# Patient Record
Sex: FEMALE | Race: BLACK OR AFRICAN AMERICAN | NOT HISPANIC OR LATINO | Employment: UNEMPLOYED | URBAN - METROPOLITAN AREA
[De-identification: names, ages, dates, MRNs, and addresses within clinical notes are randomized per-mention and may not be internally consistent; named-entity substitution may affect disease eponyms.]

---

## 2020-10-15 ENCOUNTER — NURSE TRIAGE (OUTPATIENT)
Dept: ADMINISTRATIVE | Facility: CLINIC | Age: 50
End: 2020-10-15

## 2020-10-16 NOTE — TELEPHONE ENCOUNTER
Pt states that she was returning a call from this number. No calls listed in patient chart. Pt denies any problems or concerns at this time.  Pt advised per protocol and verbalized understanding.    Reason for Disposition   [1] Follow-up call to recent contact AND [2] information only call, no triage required    Additional Information   Negative: Health Information question, no triage required and triager able to answer question   Negative: General information question, no triage required and triager able to answer question   Negative: Question about upcoming scheduled test, no triage required and triager able to answer question   Negative: [1] Caller is not with the adult (patient) AND [2] probable NON-URGENT symptoms   Negative: Requesting regular office appointment   Negative: [1] Caller requesting NON-URGENT health information AND [2] PCP's office is the best resource   Negative: RN needs further essential information from caller in order to complete triage   Negative: [1] Caller is not with the adult (patient) AND [2] reporting urgent symptoms   Negative: Lab result questions   Negative: Medication questions   Negative: Caller can't be reached by phone   Negative: Caller has already spoken to PCP or another triager    Protocols used: INFORMATION ONLY CALL-A-

## 2020-11-24 ENCOUNTER — HISTORICAL (OUTPATIENT)
Dept: RADIOLOGY | Facility: HOSPITAL | Age: 50
End: 2020-11-24

## 2022-04-07 ENCOUNTER — HISTORICAL (OUTPATIENT)
Dept: ADMINISTRATIVE | Facility: HOSPITAL | Age: 52
End: 2022-04-07

## 2022-04-24 VITALS
DIASTOLIC BLOOD PRESSURE: 72 MMHG | OXYGEN SATURATION: 99 % | HEIGHT: 69 IN | WEIGHT: 167 LBS | SYSTOLIC BLOOD PRESSURE: 113 MMHG | BODY MASS INDEX: 24.73 KG/M2

## 2023-01-19 ENCOUNTER — HOSPITAL ENCOUNTER (EMERGENCY)
Facility: HOSPITAL | Age: 53
Discharge: HOME | End: 2023-01-19
Attending: EMERGENCY MEDICINE
Payer: COMMERCIAL

## 2023-01-19 VITALS
SYSTOLIC BLOOD PRESSURE: 133 MMHG | HEIGHT: 69 IN | HEART RATE: 52 BPM | BODY MASS INDEX: 23.55 KG/M2 | RESPIRATION RATE: 16 BRPM | TEMPERATURE: 98 F | OXYGEN SATURATION: 98 % | DIASTOLIC BLOOD PRESSURE: 63 MMHG | WEIGHT: 159 LBS

## 2023-01-19 DIAGNOSIS — R55 SYNCOPE: Primary | ICD-10-CM

## 2023-01-19 DIAGNOSIS — R10.13 EPIGASTRIC ABDOMINAL PAIN: ICD-10-CM

## 2023-01-19 LAB
ALBUMIN SERPL BCP-MCNC: 4 G/DL (ref 3.5–5.2)
ALP SERPL-CCNC: 101 U/L (ref 55–135)
ALT SERPL W/O P-5'-P-CCNC: 18 U/L (ref 10–44)
ANION GAP SERPL CALC-SCNC: 7 MMOL/L (ref 8–16)
AST SERPL-CCNC: 28 U/L (ref 10–40)
B-HCG UR QL: NEGATIVE
BACTERIA #/AREA URNS AUTO: ABNORMAL /HPF
BASOPHILS # BLD AUTO: 0.03 K/UL (ref 0–0.2)
BASOPHILS NFR BLD: 0.8 % (ref 0–1.9)
BILIRUB SERPL-MCNC: 1 MG/DL (ref 0.1–1)
BILIRUB UR QL STRIP: NEGATIVE
BNP SERPL-MCNC: <10 PG/ML (ref 0–99)
BUN SERPL-MCNC: 12 MG/DL (ref 6–20)
CALCIUM SERPL-MCNC: 9.7 MG/DL (ref 8.7–10.5)
CHLORIDE SERPL-SCNC: 108 MMOL/L (ref 95–110)
CLARITY UR REFRACT.AUTO: CLEAR
CO2 SERPL-SCNC: 22 MMOL/L (ref 23–29)
COLOR UR AUTO: COLORLESS
CREAT SERPL-MCNC: 0.9 MG/DL (ref 0.5–1.4)
CTP QC/QA: YES
DIFFERENTIAL METHOD: ABNORMAL
EOSINOPHIL # BLD AUTO: 0 K/UL (ref 0–0.5)
EOSINOPHIL NFR BLD: 0.3 % (ref 0–8)
ERYTHROCYTE [DISTWIDTH] IN BLOOD BY AUTOMATED COUNT: 13.2 % (ref 11.5–14.5)
EST. GFR  (NO RACE VARIABLE): >60 ML/MIN/1.73 M^2
GLUCOSE SERPL-MCNC: 90 MG/DL (ref 70–110)
GLUCOSE UR QL STRIP: NEGATIVE
HCT VFR BLD AUTO: 41.7 % (ref 37–48.5)
HGB BLD-MCNC: 13.9 G/DL (ref 12–16)
HGB UR QL STRIP: ABNORMAL
IMM GRANULOCYTES # BLD AUTO: 0 K/UL (ref 0–0.04)
IMM GRANULOCYTES NFR BLD AUTO: 0 % (ref 0–0.5)
KETONES UR QL STRIP: NEGATIVE
LEUKOCYTE ESTERASE UR QL STRIP: NEGATIVE
LIPASE SERPL-CCNC: 27 U/L (ref 4–60)
LYMPHOCYTES # BLD AUTO: 0.6 K/UL (ref 1–4.8)
LYMPHOCYTES NFR BLD: 15.1 % (ref 18–48)
MCH RBC QN AUTO: 28.7 PG (ref 27–31)
MCHC RBC AUTO-ENTMCNC: 33.3 G/DL (ref 32–36)
MCV RBC AUTO: 86 FL (ref 82–98)
MICROSCOPIC COMMENT: ABNORMAL
MONOCYTES # BLD AUTO: 0.3 K/UL (ref 0.3–1)
MONOCYTES NFR BLD: 9 % (ref 4–15)
NEUTROPHILS # BLD AUTO: 2.7 K/UL (ref 1.8–7.7)
NEUTROPHILS NFR BLD: 74.8 % (ref 38–73)
NITRITE UR QL STRIP: NEGATIVE
NRBC BLD-RTO: 0 /100 WBC
PH UR STRIP: 7 [PH] (ref 5–8)
PLATELET # BLD AUTO: 223 K/UL (ref 150–450)
PMV BLD AUTO: 11 FL (ref 9.2–12.9)
POTASSIUM SERPL-SCNC: 4.4 MMOL/L (ref 3.5–5.1)
PROT SERPL-MCNC: 7.3 G/DL (ref 6–8.4)
PROT UR QL STRIP: NEGATIVE
RBC # BLD AUTO: 4.84 M/UL (ref 4–5.4)
RBC #/AREA URNS AUTO: 13 /HPF (ref 0–4)
SARS-COV-2 RDRP RESP QL NAA+PROBE: NEGATIVE
SODIUM SERPL-SCNC: 137 MMOL/L (ref 136–145)
SP GR UR STRIP: 1.01 (ref 1–1.03)
SQUAMOUS #/AREA URNS AUTO: 1 /HPF
TROPONIN I SERPL DL<=0.01 NG/ML-MCNC: 0.01 NG/ML (ref 0–0.03)
URN SPEC COLLECT METH UR: ABNORMAL
WBC # BLD AUTO: 3.65 K/UL (ref 3.9–12.7)
WBC #/AREA URNS AUTO: 2 /HPF (ref 0–5)

## 2023-01-19 PROCEDURE — 84484 ASSAY OF TROPONIN QUANT: CPT | Performed by: STUDENT IN AN ORGANIZED HEALTH CARE EDUCATION/TRAINING PROGRAM

## 2023-01-19 PROCEDURE — 96360 HYDRATION IV INFUSION INIT: CPT

## 2023-01-19 PROCEDURE — 93010 ELECTROCARDIOGRAM REPORT: CPT | Mod: ,,, | Performed by: INTERNAL MEDICINE

## 2023-01-19 PROCEDURE — 85025 COMPLETE CBC W/AUTO DIFF WBC: CPT | Performed by: STUDENT IN AN ORGANIZED HEALTH CARE EDUCATION/TRAINING PROGRAM

## 2023-01-19 PROCEDURE — 80053 COMPREHEN METABOLIC PANEL: CPT | Performed by: STUDENT IN AN ORGANIZED HEALTH CARE EDUCATION/TRAINING PROGRAM

## 2023-01-19 PROCEDURE — 25000003 PHARM REV CODE 250: Performed by: STUDENT IN AN ORGANIZED HEALTH CARE EDUCATION/TRAINING PROGRAM

## 2023-01-19 PROCEDURE — 83690 ASSAY OF LIPASE: CPT | Performed by: STUDENT IN AN ORGANIZED HEALTH CARE EDUCATION/TRAINING PROGRAM

## 2023-01-19 PROCEDURE — 81025 URINE PREGNANCY TEST: CPT | Performed by: STUDENT IN AN ORGANIZED HEALTH CARE EDUCATION/TRAINING PROGRAM

## 2023-01-19 PROCEDURE — 99284 PR EMERGENCY DEPT VISIT,LEVEL IV: ICD-10-PCS | Mod: CS,,, | Performed by: EMERGENCY MEDICINE

## 2023-01-19 PROCEDURE — 93010 EKG 12-LEAD: ICD-10-PCS | Mod: ,,, | Performed by: INTERNAL MEDICINE

## 2023-01-19 PROCEDURE — 99284 EMERGENCY DEPT VISIT MOD MDM: CPT | Mod: CS,,, | Performed by: EMERGENCY MEDICINE

## 2023-01-19 PROCEDURE — U0002 COVID-19 LAB TEST NON-CDC: HCPCS | Performed by: EMERGENCY MEDICINE

## 2023-01-19 PROCEDURE — 83880 ASSAY OF NATRIURETIC PEPTIDE: CPT | Performed by: STUDENT IN AN ORGANIZED HEALTH CARE EDUCATION/TRAINING PROGRAM

## 2023-01-19 PROCEDURE — 93005 ELECTROCARDIOGRAM TRACING: CPT

## 2023-01-19 PROCEDURE — 99284 EMERGENCY DEPT VISIT MOD MDM: CPT | Mod: 25

## 2023-01-19 PROCEDURE — 81001 URINALYSIS AUTO W/SCOPE: CPT | Performed by: EMERGENCY MEDICINE

## 2023-01-19 RX ORDER — MAG HYDROX/ALUMINUM HYD/SIMETH 200-200-20
15 SUSPENSION, ORAL (FINAL DOSE FORM) ORAL
Status: COMPLETED | OUTPATIENT
Start: 2023-01-19 | End: 2023-01-19

## 2023-01-19 RX ORDER — FAMOTIDINE 20 MG/1
20 TABLET, FILM COATED ORAL 2 TIMES DAILY
Qty: 20 TABLET | Refills: 0 | Status: SHIPPED | OUTPATIENT
Start: 2023-01-19 | End: 2023-01-19 | Stop reason: SDUPTHER

## 2023-01-19 RX ORDER — FAMOTIDINE 20 MG/1
20 TABLET, FILM COATED ORAL 2 TIMES DAILY
Qty: 60 TABLET | Refills: 0 | Status: ON HOLD | OUTPATIENT
Start: 2023-01-19 | End: 2023-02-03 | Stop reason: HOSPADM

## 2023-01-19 RX ADMIN — SODIUM CHLORIDE 500 ML: 9 INJECTION, SOLUTION INTRAVENOUS at 07:01

## 2023-01-19 RX ADMIN — ALUMINUM HYDROXIDE, MAGNESIUM HYDROXIDE, AND SIMETHICONE 15 ML: 200; 200; 20 SUSPENSION ORAL at 07:01

## 2023-01-19 RX ADMIN — ALUMINUM HYDROXIDE, MAGNESIUM HYDROXIDE, AND SIMETHICONE 15 ML: 200; 200; 20 SUSPENSION ORAL at 09:01

## 2023-01-19 NOTE — ED PROVIDER NOTES
Encounter Date: 1/19/2023       History     Chief Complaint   Patient presents with    Loss of Consciousness     Reports syncope last night.  States she woke up at 0100 feeling like her heart was racing, she got light headed and thinks she passed out.  Woke up on floor, unsure of what happened.     Patient is a 52 year old female with a PMHx of menorrhagia who presents to the ED for complaints of a syncopal event and 2 subsequent near syncopal events. She was awoken from sleep secondary to epigastric abdominal comfort described as indigestion and a need to go to the bathroom along with lightheadedness. While on way to bathroom, she syncopized and thinks she may have hit her face as she is having mild right sided jaw pain and right sided shoulder pain. She then awoke and had the other 2 near syncopal events while sitting on the toilet. States her PO intake has been decreased and has also had 2 bouts of NB diarrhea. No associated fevers or vomiting. She further denies chest pain, shortness of breath, black/bloody stools or recent bouts of menorrhagia.    Review of patient's allergies indicates:   Allergen Reactions    Toradol [ketorolac] Other (See Comments)     Hypotension reported     No past medical history on file.  No past surgical history on file.  No family history on file.     Review of Systems   Constitutional:  Negative for activity change, chills and fever.   HENT:  Negative for congestion, ear pain and sore throat.         Jaw pain   Respiratory:  Negative for shortness of breath and stridor.    Cardiovascular:  Negative for chest pain and palpitations.   Gastrointestinal:  Positive for abdominal pain (epigastric) and diarrhea (x2). Negative for nausea and vomiting.   Genitourinary:  Negative for dysuria and urgency.   Musculoskeletal:  Positive for myalgias. Negative for back pain.   Skin:  Negative for rash.   Neurological:  Positive for syncope. Negative for dizziness, weakness and headaches.    Hematological:  Does not bruise/bleed easily.     Physical Exam     Initial Vitals [01/19/23 0552]   BP Pulse Resp Temp SpO2   111/71 76 16 97.9 °F (36.6 °C) 100 %      MAP       --         Physical Exam    Nursing note and vitals reviewed.  Constitutional: She appears well-developed and well-nourished. She is not diaphoretic. No distress.   Well-appearing. Speaking full sentences. No acute distress.   HENT:   Head: Normocephalic and atraumatic.   Right Ear: External ear normal.   Left Ear: External ear normal.   No trismus. No malocclusion. Able to bite down on tongue depressor with applied traction.   Neck: Neck supple.   Cardiovascular:  Normal rate, regular rhythm, normal heart sounds and intact distal pulses.           Pulmonary/Chest: Breath sounds normal. No respiratory distress. She has no wheezes. She has no rhonchi. She has no rales.   Abdominal: Abdomen is soft. She exhibits no distension. There is no abdominal tenderness. There is no rebound and no guarding.   Musculoskeletal:      Cervical back: Neck supple.      Comments: Tenderness to palpation of right upper trapezius with normal ROM at shoulder joint without bony deformity.     Neurological: She is alert and oriented to person, place, and time. GCS score is 15. GCS eye subscore is 4. GCS verbal subscore is 5. GCS motor subscore is 6.   Skin: Skin is warm. Capillary refill takes less than 2 seconds. No rash noted.   Psychiatric: She has a normal mood and affect.       ED Course   Procedures  Labs Reviewed   CBC W/ AUTO DIFFERENTIAL - Abnormal; Notable for the following components:       Result Value    WBC 3.65 (*)     Lymph # 0.6 (*)     Gran % 74.8 (*)     Lymph % 15.1 (*)     All other components within normal limits    Narrative:     add on lipase per Geo Barrios MD order# [id number] 01/19/2023 @   06:54    COMPREHENSIVE METABOLIC PANEL - Abnormal; Notable for the following components:    CO2 22 (*)     Anion Gap 7 (*)     All other  components within normal limits    Narrative:     add on lipase per Geo Barrios MD order# [id number] 01/19/2023 @   06:54    URINALYSIS, REFLEX TO URINE CULTURE - Abnormal; Notable for the following components:    Color, UA Colorless (*)     Occult Blood UA 2+ (*)     All other components within normal limits    Narrative:     Specimen Source->Urine   URINALYSIS MICROSCOPIC - Abnormal; Notable for the following components:    RBC, UA 13 (*)     All other components within normal limits    Narrative:     Specimen Source->Urine   LIPASE   LIPASE    Narrative:     add on lipase per Geo Barrios MD order# [id number] 01/19/2023 @   06:54    SARS-COV-2 RNA AMPLIFICATION, QUAL   TROPONIN I   B-TYPE NATRIURETIC PEPTIDE   B-TYPE NATRIURETIC PEPTIDE    Narrative:     add on lipase per Geo Barrios MD order# [id number] 01/19/2023 @   06:54   add BNP per Dr. Inés To  01/19/2023  07:23     add on troponin per Inés To MD order# [id number] 01/19/2023 @ 07:53    TROPONIN I    Narrative:     add on lipase per Geo Barrios MD order# [id number] 01/19/2023 @   06:54   add BNP per Dr. Inés To  01/19/2023  07:23     add on troponin per Inés To MD order# [id number] 01/19/2023 @ 07:53    POCT URINE PREGNANCY     EKG Readings: (Independently Interpreted)   Initial Reading: No STEMI. Rhythm: Normal Sinus Rhythm. Heart Rate: 63. Ectopy: No Ectopy. Conduction: Normal.   Atrial enlargement. No prior EKGs for comparison.   ECG Results              EKG 12-lead (Final result)  Result time 01/19/23 10:28:58      Final result by Interface, Lab [facility] (01/19/23 10:28:58)                   Narrative:    Test Reason : R55,    Vent. Rate : 063 BPM     Atrial Rate : 063 BPM     P-R Int : 158 ms          QRS Dur : 068 ms      QT Int : 392 ms       P-R-T Axes : 069 100 057 degrees     QTc Int : 401 ms    Normal sinus rhythm  Right atrial enlargement  Cannot exclude  Lateral infarct ,age undetermined  Abnormal ECG  No previous  ECGs available  Confirmed by Taye MADISON MD ([id number]) on 1/19/2023 10:28:49 AM    Referred By: [email or web address]   SELF           Confirmed By:Taye MADISON MD                                  Imaging Results    None          Medications   aluminum-magnesium hydroxide-simethicone 200-200-20 mg/5 mL suspension 15 mL (15 mLs Oral Given 1/19/23 3301)   sodium chloride 0.9% bolus 500 mL 500 mL (0 mLs Intravenous Stopped 1/19/23 0845)   aluminum-magnesium hydroxide-simethicone 200-200-20 mg/5 mL suspension 15 mL (15 mLs Oral Given 1/19/23 0915)     Medical Decision Making:   History:   Old Medical Records: I decided to obtain old medical records.  Initial Assessment:   Emergent evaluation of syncope/near syncopal events. She is afebrile and hemodynamically stable.  Differential Diagnosis:   Vasovagal vs orthostatic syncope, GERD/gastritis, electrolyte abnormality, dehydration, VALENTINA  Clinical Tests:   Lab Tests: Ordered and Reviewed  Medical Tests: Ordered and Reviewed  ED Management:  Additionally considered mandibular fracture, but less likely given physical exam. Overall, her presentation is most consistent with orthostatic syncope. Abdominal pain is improved after Maalox x 2, and she remains asymptomatic on reassessment.  Labs are unremarkable. Given IVF. Referral to Cardiology placed for possible Holter monitor if she continues to be symptomatic with recurrent syncopal events.  Additionally given strict ED return precautions, and she expressed understanding.                        Clinical Impression:   Final diagnoses:  [R55] Syncope (Primary)  [R10.13] Epigastric abdominal pain        ED Disposition Condition    Discharge Stable          ED Prescriptions       Medication Sig Dispense Start Date End Date Auth. Provider    aluminum-magnesium hydroxide 200-200 mg/5 mL suspension Take 15 mLs by mouth every 6 (six) hours as needed for Indigestion. 60 mL 1/19/2023 2/18/2023 Geo Barrios MD    famotidine (PEPCID) 20 MG tablet   (Status: Discontinued) Take 1 tablet (20 mg total) by mouth 2 (two) times daily. 20 tablet 1/19/2023 1/19/2023 Geo Barrios MD    famotidine (PEPCID) 20 MG tablet Take 1 tablet (20 mg total) by mouth 2 (two) times daily. 60 tablet 1/19/2023 2/18/2023 Geo Barrios MD          Follow-up Information    None          Geo Barrios MD  Resident  01/19/23 6082

## 2023-01-19 NOTE — DISCHARGE INSTRUCTIONS
Please return to the ER if you begin to experience symptoms including chest pain or shortness of breath. Additionally, if you begin to have recurrent episodes of syncope (passing out), please return as soon as possible. You are being given a referral to cardiology for further workup of the syncopal events. Please follow up with them if you continue to have symptoms.

## 2023-01-19 NOTE — ED TRIAGE NOTES
"Marialuisa Joseph, a 52 y.o. female presents to the ED w/ complaint of syncopal episode. Pt reports she "went black" last night while trying to walk to the bathroom. Pt reports she felt her heart racing and became lightheaded. Pt reports she woke up on the floor and is unsure of what happened. Reports jaw pain and pain to right side of face. Pt states "I think I fell face first onto the tile bathroom floor." Currently Aox4. Hyperactivity and pressured speech noted. Denies n/v. Reports two episodes of diarrhea at home.     Triage note:  Chief Complaint   Patient presents with    Loss of Consciousness     Reports syncope last night.  States she woke up at 0100 feeling like her heart was racing, she got light headed and thinks she passed out.  Woke up on floor, unsure of what happened.     Review of patient's allergies indicates:   Allergen Reactions    Toradol [ketorolac] Other (See Comments)     Hypotension reported     No past medical history on file.    Patient identifiers verified and correct for Marialuisa Joseph    LOC: The patient is awake, alert, and aware of environment. The patient is AOX4 and speaking appropriately.   APPEARANCE: No acute distress noted.   HEENT: Reports jaw pain and pain to R face, PERRLA  PSYCHOSOCIAL: Patient is hyperactive with pressure speech. Denies SI/HI.  SKIN: The skin is warm, dry, color consistent with ethnicity. No breakdown or brusing visible.  RESPIRATORY: Airway is open and patent. Bilateral chest rise and fall. Respiratory rate even and unlabored.  No accessory muscle use noted.  CARDIAC: Patient has a normal rate and rhythm. No complaints of chest pain.  ABDOMEN/GI: Reports intermittent stomach discomfort and "bubbling." Reports 2 episodes of diarrhea at home this morning.  Soft, non tender. No distention noted. Denies n/v.  URINARY:  Voids independently without difficulty. No complaints of frequency, urgency, burning, or blood in urine.   NEUROLOGIC: Eyes open " spontaneously. Speech clear.  Able to follow commands, demonstrating ability to actively and appropriately communicate within context of current conversation. Symmetrical facial muscles. Movement is purposeful. Denies current dizziness/lightheadedness.  MUSCULOSKELETAL: No obvious deformities noted. Full ROM in all extremities.  PERIPHERAL VASCULAR: Cap refill <3 secs bilaterally. No peripheral edema noted. Denies numbness and tingling in extremities.

## 2023-01-19 NOTE — PROVIDER PROGRESS NOTES - EMERGENCY DEPT.
Encounter Date: 1/19/2023    ED Physician Progress Notes          Physician Attestation Statement for Resident:  As the supervising MD   Physician Attestation Statement: I have personally seen and examined this patient.       I agree with the history unless otherwise noted.     As the supervising MD I agree with the PE unless otherwise noted.      I have reviewed and agree with the residents interpretation of the following unless otherwise noted:   I have personally reviewed and interpreted the patients laboratory studies: trop neg, hgb 13, lipase WNL, COVId neg  I have personally reviewed and interpreted imaging studies: Bedside US with no pericardial effusion, nl appearing function  I have personally reviewed and interpreted the patient's EKG: NSR, no ischemic ST/TW changes      I have also reviewed the following:   old records at this facility: + menorrhagia  old EKGs: no old EKG for review    As the supervising MD I agree with the treatment, course, plan, and disposition unless otherwise noted.    Regarding syncope:   No evidence of dysrhythmia on EKG, nor prolonged QTc  No evidence of ischemia on EKG and negative troponin   No evidence of significant hyponatremia   No evidence of significant anemia   No clinical evidence to suggest PE  Bedside US with nl appearing EF, no effusion  Patient's description of sxs is most c/w vasovagal syncope    Patient was discharged home with PMD f/u as needed with strict RTED precautions.

## 2023-01-31 ENCOUNTER — OFFICE VISIT (OUTPATIENT)
Dept: FAMILY MEDICINE | Facility: CLINIC | Age: 53
End: 2023-01-31
Payer: COMMERCIAL

## 2023-01-31 VITALS
OXYGEN SATURATION: 99 % | WEIGHT: 161.81 LBS | DIASTOLIC BLOOD PRESSURE: 66 MMHG | SYSTOLIC BLOOD PRESSURE: 111 MMHG | HEART RATE: 51 BPM | BODY MASS INDEX: 23.89 KG/M2

## 2023-01-31 DIAGNOSIS — R00.2 PALPITATIONS: ICD-10-CM

## 2023-01-31 DIAGNOSIS — R94.31 ABNORMAL EKG: ICD-10-CM

## 2023-01-31 DIAGNOSIS — R42 DIZZINESS AND GIDDINESS: ICD-10-CM

## 2023-01-31 DIAGNOSIS — R55 SYNCOPE, UNSPECIFIED SYNCOPE TYPE: Primary | ICD-10-CM

## 2023-01-31 DIAGNOSIS — R42 DIZZINESS: ICD-10-CM

## 2023-01-31 PROCEDURE — 1160F PR REVIEW ALL MEDS BY PRESCRIBER/CLIN PHARMACIST DOCUMENTED: ICD-10-PCS | Mod: CPTII,,, | Performed by: FAMILY MEDICINE

## 2023-01-31 PROCEDURE — 3078F DIAST BP <80 MM HG: CPT | Mod: CPTII,,, | Performed by: FAMILY MEDICINE

## 2023-01-31 PROCEDURE — 3074F PR MOST RECENT SYSTOLIC BLOOD PRESSURE < 130 MM HG: ICD-10-PCS | Mod: CPTII,,, | Performed by: FAMILY MEDICINE

## 2023-01-31 PROCEDURE — 1160F RVW MEDS BY RX/DR IN RCRD: CPT | Mod: CPTII,,, | Performed by: FAMILY MEDICINE

## 2023-01-31 PROCEDURE — 1159F PR MEDICATION LIST DOCUMENTED IN MEDICAL RECORD: ICD-10-PCS | Mod: CPTII,,, | Performed by: FAMILY MEDICINE

## 2023-01-31 PROCEDURE — 3008F PR BODY MASS INDEX (BMI) DOCUMENTED: ICD-10-PCS | Mod: CPTII,,, | Performed by: FAMILY MEDICINE

## 2023-01-31 PROCEDURE — 99214 OFFICE O/P EST MOD 30 MIN: CPT | Mod: ,,, | Performed by: FAMILY MEDICINE

## 2023-01-31 PROCEDURE — 99214 PR OFFICE/OUTPT VISIT, EST, LEVL IV, 30-39 MIN: ICD-10-PCS | Mod: ,,, | Performed by: FAMILY MEDICINE

## 2023-01-31 PROCEDURE — 3008F BODY MASS INDEX DOCD: CPT | Mod: CPTII,,, | Performed by: FAMILY MEDICINE

## 2023-01-31 PROCEDURE — 1159F MED LIST DOCD IN RCRD: CPT | Mod: CPTII,,, | Performed by: FAMILY MEDICINE

## 2023-01-31 PROCEDURE — 3078F PR MOST RECENT DIASTOLIC BLOOD PRESSURE < 80 MM HG: ICD-10-PCS | Mod: CPTII,,, | Performed by: FAMILY MEDICINE

## 2023-01-31 PROCEDURE — 3074F SYST BP LT 130 MM HG: CPT | Mod: CPTII,,, | Performed by: FAMILY MEDICINE

## 2023-01-31 NOTE — PROGRESS NOTES
Patient ID: [id number]     Chief Complaint: Follow-up (Hospital visit)        HPI:     Marialuisa Joseph is a 52 y.o. female here today for followup s/p ER visit on 01/19/2023 due to dizziness and 2 syncopal episodes. She had NL CBC, CMP, and UA. Had abnormal EKG, but hasn't seen Cardiology. She hasn't had CT head. She has a history of menorrhagia, always has RBCs in urine. She reports maternal GM with angina, maternal GGM had pacemaker, maternal great aunt had pacemaker, mother has HTN. She has some episodes of dizzy spells, headaches, and palpitations at times, but no syncope since ER visit. She denies fever, chills, nausea, vomiting, chest pain, SOB, edema, anxiety and depression.  - Patient is without any other complaints today.     ----------------------------  Arthritis     Past Surgical History:   Procedure Laterality Date    TUMOR REMOVAL Bilateral 1988       Review of patient's allergies indicates:   Allergen Reactions    Toradol [ketorolac] Other (See Comments)     Hypotension reported       No outpatient medications have been marked as taking for the 1/31/23 encounter (Office Visit) with Lisbeth Ocampo MD.       Social History     Socioeconomic History    Marital status:    Occupational History    Occupation: Researcher   Tobacco Use    Smoking status: Never    Smokeless tobacco: Never   Substance and Sexual Activity    Alcohol use: Not Currently    Drug use: Never    Sexual activity: Yes        History reviewed. No pertinent family history.     Subjective:       Review of Systems:    See HPI for details    Constitutional: Denies Change in appetite. Denies Chills. Denies Fever. Denies Night sweats.  Eye: Denies Blurred vision. Denies Discharge. Denies Eye pain.  ENT: Denies Decreased hearing. Denies Sore throat. Denies Swollen glands.  Respiratory: Denies Cough. Denies Shortness of breath. Denies Shortness of breath with exertion. Denies Wheezing.  Cardiovascular: As per HPI. Denies  Chest pain at rest. Denies Chest pain with exertion. Denies Irregular heartbeat.   Gastrointestinal: Denies Abdominal pain. Denies Diarrhea. Denies Nausea. Denies Vomiting. Denies Hematemesis or Hematochezia.  Genitourinary: Denies Dysuria. Denies Urinary frequency. Denies Urinary urgency. Denies Blood in urine.  Endocrine: Denies Cold intolerance. Denies Excessive thirst. Denies Heat intolerance. Denies Weight loss. Denies Weight gain.  Musculoskeletal: Denies Painful joints. Denies Weakness.  Integumentary: Denies Rash. Denies Itching. Denies Dry skin.  Neurologic: As per HPI. Denies Headache.  Psychiatric: Denies Depression. Denies Anxiety. Denies Suicidal/Homicidal ideations.    All Other ROS: Negative except as stated in HPI.       Objective:     /66 (BP Location: Right arm, Patient Position: Sitting, BP Method: Medium (Automatic))   Pulse (!) 51   Wt 73.4 kg (161 lb 12.8 oz)   LMP 01/23/2023 (Exact Date) Comment: Spotting having periods every few months  SpO2 99%   BMI 23.89 kg/m²     Physical Exam    General: Alert and oriented, No acute distress.  Head: Normocephalic, Atraumatic.  Eye: Pupils are equal, round and reactive to light, Extraocular movements are intact, Sclera non-icteric.  Ears/Nose/Throat: Normal, Mucosa moist,Clear.  Neck/Thyroid: Supple, Non-tender, No carotid bruit, No palpable thyromegaly or thyroid nodule, No lymphadenopathy, No JVD, Full range of motion.  Respiratory: Clear to auscultation bilaterally; No wheezes, rales or rhonchi,Non-labored respirations, Symmetrical chest wall expansion.  Cardiovascular: Bradycardia, regular rhythm, S1/S2 normal, No murmurs, rubs or gallops.  Gastrointestinal: Soft, Non-tender, Non-distended, Normal bowel sounds, No palpable organomegaly.  Musculoskeletal: Normal range of motion.  Integumentary: Warm, Dry, Intact, No suspicious lesions or rashes.  Extremities: No clubbing, cyanosis or edema  Neurologic: No focal deficits, Cranial Nerves  II-XII are grossly intact, Motor strength normal upper and lower extremities, Sensory exam intact.  Psychiatric: Normal interaction, Coherent speech, Euthymic mood, Appropriate affect.     *ER records from 01/19/2023 were reviewed and discussed with patient and patient voices understanding.*      Assessment:       ICD-10-CM ICD-9-CM   1. Syncope, unspecified syncope type  R55 780.2   2. Dizziness  R42 780.4   3. Palpitations  R00.2 785.1   4. Abnormal EKG  R94.31 794.31   5. Dizziness and giddiness  R42 780.4        Plan:     Problem List Items Addressed This Visit    None  Visit Diagnoses       Syncope, unspecified syncope type    -  Primary    Relevant Orders    Ambulatory referral/consult to Cardiology    Dizziness        Relevant Orders    Ambulatory referral/consult to Cardiology    Palpitations        Abnormal EKG        Relevant Orders    Ambulatory referral/consult to Cardiology    Dizziness and giddiness        Relevant Orders    CT Head Without Contrast         1. Syncope, unspecified syncope type  - Ambulatory referral/consult to Cardiology; Future for cardiac evaluation.  - Fall precautions encouraged. If Cardiac workup is negative, will proceed with Neurology referral. Patient will have wellness with labs done in 8 weeks. Notify M.D. or ER if symptoms persist or worsen, temp >100.4, or any acute illness.      2. Dizziness  - Ambulatory referral/consult to Cardiology; Future  - Same as #1.     3. Palpitations  - Currently asymptomatic, same as #1.     4. Abnormal EKG  - Ambulatory referral/consult to Cardiology; Future  - Same as #1.     5. Dizziness and giddiness  - CT Head Without Contrast; Future  - Same as #1.       Marialuisa was seen today for follow-up.    Diagnoses and all orders for this visit:    Syncope, unspecified syncope type  -     Ambulatory referral/consult to Cardiology; Future    Dizziness  -     Ambulatory referral/consult to Cardiology; Future    Palpitations    Abnormal EKG  -      Ambulatory referral/consult to Cardiology; Future    Dizziness and giddiness  -     CT Head Without Contrast; Future          Medication List with Changes/Refills   Current Medications    ALUMINUM-MAGNESIUM HYDROXIDE 200-200 MG/5 ML SUSPENSION    Take 15 mLs by mouth every 6 (six) hours as needed for Indigestion.       Start Date: 1/19/2023 End Date: 2/18/2023    FAMOTIDINE (PEPCID) 20 MG TABLET    Take 1 tablet (20 mg total) by mouth 2 (two) times daily.       Start Date: 1/19/2023 End Date: 2/18/2023          Follow up in about 2 months (around 3/31/2023) for Wellness.

## 2023-02-02 ENCOUNTER — HOSPITAL ENCOUNTER (OUTPATIENT)
Facility: HOSPITAL | Age: 53
LOS: 1 days | Discharge: HOME | End: 2023-02-03
Attending: FAMILY MEDICINE | Admitting: INTERNAL MEDICINE
Payer: COMMERCIAL

## 2023-02-02 ENCOUNTER — TELEPHONE (OUTPATIENT)
Dept: FAMILY MEDICINE | Facility: CLINIC | Age: 53
End: 2023-02-02
Payer: COMMERCIAL

## 2023-02-02 DIAGNOSIS — R00.2 PALPITATIONS: ICD-10-CM

## 2023-02-02 DIAGNOSIS — R00.1 SYMPTOMATIC BRADYCARDIA: Primary | ICD-10-CM

## 2023-02-02 DIAGNOSIS — R00.0 RAPID HEART BEAT: ICD-10-CM

## 2023-02-02 DIAGNOSIS — R00.1 BRADYCARDIA: ICD-10-CM

## 2023-02-02 DIAGNOSIS — R55 NEAR SYNCOPE: ICD-10-CM

## 2023-02-02 LAB
ABS NEUT CALC (OHS): 1.1 X10(3)/MCL (ref 2.1–9.2)
ALBUMIN SERPL-MCNC: 3.6 G/DL (ref 3.5–5)
ALBUMIN/GLOB SERPL: 1.1 RATIO (ref 1.1–2)
ALP SERPL-CCNC: 177 UNIT/L (ref 40–150)
ALT SERPL-CCNC: 83 UNIT/L (ref 0–55)
ANISOCYTOSIS BLD QL SMEAR: ABNORMAL
AST SERPL-CCNC: 48 UNIT/L (ref 5–34)
AV INDEX (PROSTH): 0.62
AV MEAN GRADIENT: 5 MMHG
AV PEAK GRADIENT: 11 MMHG
AV VALVE AREA: 2.03 CM2
AV VELOCITY RATIO: 0.64
B-HCG SERPL QL: NEGATIVE
BASOPHILS NFR BLD MANUAL: 0.07 X10(3)/MCL (ref 0–0.2)
BASOPHILS NFR BLD MANUAL: 3 % (ref 0–2)
BILIRUBIN DIRECT+TOT PNL SERPL-MCNC: 1.3 MG/DL
BSA FOR ECHO PROCEDURE: 1.91 M2
BUN SERPL-MCNC: 9.5 MG/DL (ref 9.8–20.1)
CALCIUM SERPL-MCNC: 9.6 MG/DL (ref 8.4–10.2)
CHLORIDE SERPL-SCNC: 106 MMOL/L (ref 98–107)
CO2 SERPL-SCNC: 25 MMOL/L (ref 22–29)
CREAT SERPL-MCNC: 1.15 MG/DL (ref 0.55–1.02)
CV ECHO LV RWT: 0.32 CM
DOP CALC AO PEAK VEL: 1.63 M/S
DOP CALC AO VTI: 37.5 CM
DOP CALC LVOT AREA: 3.3 CM2
DOP CALC LVOT DIAMETER: 2.05 CM
DOP CALC LVOT PEAK VEL: 1.05 M/S
DOP CALC LVOT STROKE VOLUME: 76.21 CM3
DOP CALC MV VTI: 33.1 CM
DOP CALCLVOT PEAK VEL VTI: 23.1 CM
E WAVE DECELERATION TIME: 304.76 MSEC
E/A RATIO: 1.52
E/E' RATIO: 7.83 M/S
ECHO LV POSTERIOR WALL: 0.79 CM (ref 0.6–1.1)
EJECTION FRACTION: 65 %
EOSINOPHIL NFR BLD MANUAL: 0.02 X10(3)/MCL (ref 0–0.9)
EOSINOPHIL NFR BLD MANUAL: 1 % (ref 0–8)
ERYTHROCYTE [DISTWIDTH] IN BLOOD BY AUTOMATED COUNT: 12.8 % (ref 11.5–17)
FRACTIONAL SHORTENING: 34 % (ref 28–44)
GFR SERPLBLD CREATININE-BSD FMLA CKD-EPI: 58 MLS/MIN/1.73/M2
GLOBULIN SER-MCNC: 3.4 GM/DL (ref 2.4–3.5)
GLUCOSE SERPL-MCNC: 88 MG/DL (ref 74–100)
HCT VFR BLD AUTO: 40.7 % (ref 37–47)
HGB BLD-MCNC: 13.6 GM/DL (ref 12–16)
HIV 1+2 AB+HIV1 P24 AG SERPL QL IA: NONREACTIVE
IMM GRANULOCYTES # BLD AUTO: 0 X10(3)/MCL (ref 0–0.04)
IMM GRANULOCYTES NFR BLD AUTO: 0 %
INTERVENTRICULAR SEPTUM: 0.86 CM (ref 0.6–1.1)
LEFT ATRIUM SIZE: 2.58 CM
LEFT INTERNAL DIMENSION IN SYSTOLE: 3.25 CM (ref 2.1–4)
LEFT VENTRICLE DIASTOLIC VOLUME INDEX: 59.55 ML/M2
LEFT VENTRICLE DIASTOLIC VOLUME: 113.74 ML
LEFT VENTRICLE MASS INDEX: 72 G/M2
LEFT VENTRICLE SYSTOLIC VOLUME INDEX: 22.3 ML/M2
LEFT VENTRICLE SYSTOLIC VOLUME: 42.58 ML
LEFT VENTRICULAR INTERNAL DIMENSION IN DIASTOLE: 4.92 CM (ref 3.5–6)
LEFT VENTRICULAR MASS: 137.46 G
LV LATERAL E/E' RATIO: 7.83 M/S
LV SEPTAL E/E' RATIO: 7.83 M/S
LVOT MG: 1.92 MMHG
LVOT MV: 0.64 CM/S
LYMPHOCYTES NFR BLD MANUAL: 1.15 X10(3)/MCL
LYMPHOCYTES NFR BLD MANUAL: 48 % (ref 13–40)
MCH RBC QN AUTO: 28.6 PG
MCHC RBC AUTO-ENTMCNC: 33.4 MG/DL (ref 33–36)
MCV RBC AUTO: 85.7 FL (ref 80–94)
MONOCYTES NFR BLD MANUAL: 0.05 X10(3)/MCL (ref 0.1–1.3)
MONOCYTES NFR BLD MANUAL: 2 % (ref 2–11)
MV MEAN GRADIENT: 1 MMHG
MV PEAK A VEL: 0.62 M/S
MV PEAK E VEL: 0.94 M/S
MV PEAK GRADIENT: 3 MMHG
MV STENOSIS PRESSURE HALF TIME: 88.38 MS
MV VALVE AREA BY CONTINUITY EQUATION: 2.3 CM2
MV VALVE AREA P 1/2 METHOD: 2.49 CM2
NEUTROPHILS NFR BLD MANUAL: 46 % (ref 47–80)
NRBC BLD AUTO-RTO: 0 %
PISA TR MAX VEL: 2.19 M/S
PLATELET # BLD AUTO: 216 X10(3)/MCL (ref 130–400)
PLATELET # BLD EST: NORMAL 10*3/UL
PMV BLD AUTO: 11 FL (ref 7.4–10.4)
POIKILOCYTOSIS BLD QL SMEAR: ABNORMAL
POLYCHROMASIA BLD QL SMEAR: ABNORMAL
POTASSIUM SERPL-SCNC: 3.8 MMOL/L (ref 3.5–5.1)
PROT SERPL-MCNC: 7 GM/DL (ref 6.4–8.3)
RA PRESSURE: 15 MMHG
RBC # BLD AUTO: 4.75 X10(6)/MCL (ref 4.2–5.4)
RBC MORPH BLD: ABNORMAL
RIGHT VENTRICULAR END-DIASTOLIC DIMENSION: 2.04 CM
SCHISTOCYTE (OLG): ABNORMAL
SODIUM SERPL-SCNC: 139 MMOL/L (ref 136–145)
T PALLIDUM AB SER QL: NONREACTIVE
T4 FREE SERPL-MCNC: 0.92 NG/DL (ref 0.7–1.48)
TDI LATERAL: 0.12 M/S
TDI SEPTAL: 0.12 M/S
TDI: 0.12 M/S
TR MAX PG: 19 MMHG
TROPONIN I SERPL-MCNC: <0.01 NG/ML (ref 0–0.04)
TSH SERPL-ACNC: 2.24 UIU/ML (ref 0.35–4.94)
TV REST PULMONARY ARTERY PRESSURE: 34 MMHG
WBC # SPEC AUTO: 2.4 X10(3)/MCL (ref 4.5–11.5)

## 2023-02-02 PROCEDURE — 86780 TREPONEMA PALLIDUM: CPT

## 2023-02-02 PROCEDURE — 96360 HYDRATION IV INFUSION INIT: CPT

## 2023-02-02 PROCEDURE — G0378 HOSPITAL OBSERVATION PER HR: HCPCS

## 2023-02-02 PROCEDURE — 84439 ASSAY OF FREE THYROXINE: CPT

## 2023-02-02 PROCEDURE — 25000003 PHARM REV CODE 250

## 2023-02-02 PROCEDURE — 85027 COMPLETE CBC AUTOMATED: CPT | Performed by: FAMILY MEDICINE

## 2023-02-02 PROCEDURE — 99285 EMERGENCY DEPT VISIT HI MDM: CPT | Mod: 25

## 2023-02-02 PROCEDURE — 87389 HIV-1 AG W/HIV-1&-2 AB AG IA: CPT

## 2023-02-02 PROCEDURE — 96361 HYDRATE IV INFUSION ADD-ON: CPT

## 2023-02-02 PROCEDURE — 84484 ASSAY OF TROPONIN QUANT: CPT | Performed by: FAMILY MEDICINE

## 2023-02-02 PROCEDURE — 84703 CHORIONIC GONADOTROPIN ASSAY: CPT | Performed by: FAMILY MEDICINE

## 2023-02-02 PROCEDURE — 84443 ASSAY THYROID STIM HORMONE: CPT | Performed by: FAMILY MEDICINE

## 2023-02-02 PROCEDURE — 80053 COMPREHEN METABOLIC PANEL: CPT | Performed by: FAMILY MEDICINE

## 2023-02-02 PROCEDURE — 63600175 PHARM REV CODE 636 W HCPCS

## 2023-02-02 PROCEDURE — 93005 ELECTROCARDIOGRAM TRACING: CPT

## 2023-02-02 RX ORDER — SODIUM CHLORIDE 0.9 % (FLUSH) 0.9 %
10 SYRINGE (ML) INJECTION EVERY 12 HOURS PRN
Status: DISCONTINUED | OUTPATIENT
Start: 2023-02-02 | End: 2023-02-03 | Stop reason: HOSPADM

## 2023-02-02 RX ORDER — TALC
6 POWDER (GRAM) TOPICAL NIGHTLY PRN
Status: DISCONTINUED | OUTPATIENT
Start: 2023-02-02 | End: 2023-02-03 | Stop reason: HOSPADM

## 2023-02-02 RX ORDER — SODIUM CHLORIDE, SODIUM LACTATE, POTASSIUM CHLORIDE, CALCIUM CHLORIDE 600; 310; 30; 20 MG/100ML; MG/100ML; MG/100ML; MG/100ML
INJECTION, SOLUTION INTRAVENOUS CONTINUOUS
Status: DISCONTINUED | OUTPATIENT
Start: 2023-02-02 | End: 2023-02-03 | Stop reason: HOSPADM

## 2023-02-02 RX ORDER — HEPARIN SODIUM 5000 [USP'U]/ML
5000 INJECTION, SOLUTION INTRAVENOUS; SUBCUTANEOUS EVERY 12 HOURS
Status: DISCONTINUED | OUTPATIENT
Start: 2023-02-03 | End: 2023-02-03 | Stop reason: HOSPADM

## 2023-02-02 RX ORDER — GLUCAGON 1 MG
1 KIT INJECTION
Status: DISCONTINUED | OUTPATIENT
Start: 2023-02-02 | End: 2023-02-03 | Stop reason: HOSPADM

## 2023-02-02 RX ORDER — IBUPROFEN 200 MG
24 TABLET ORAL
Status: DISCONTINUED | OUTPATIENT
Start: 2023-02-02 | End: 2023-02-03 | Stop reason: HOSPADM

## 2023-02-02 RX ORDER — IBUPROFEN 200 MG
16 TABLET ORAL
Status: DISCONTINUED | OUTPATIENT
Start: 2023-02-02 | End: 2023-02-03 | Stop reason: HOSPADM

## 2023-02-02 RX ORDER — NALOXONE HCL 0.4 MG/ML
0.02 VIAL (ML) INJECTION
Status: DISCONTINUED | OUTPATIENT
Start: 2023-02-02 | End: 2023-02-03 | Stop reason: HOSPADM

## 2023-02-02 RX ADMIN — MELATONIN TAB 3 MG 6 MG: 3 TAB at 08:02

## 2023-02-02 RX ADMIN — SODIUM CHLORIDE, POTASSIUM CHLORIDE, SODIUM LACTATE AND CALCIUM CHLORIDE: 600; 310; 30; 20 INJECTION, SOLUTION INTRAVENOUS at 02:02

## 2023-02-02 NOTE — TELEPHONE ENCOUNTER
----- Message from Rebecca Murphy sent at 2/2/2023  1:29 PM CST -----  Regarding: Pt call  Pt called and left a message stating she is still having heart problems and dizziness again. She stated she was given a referral to a cardiologist but she still has not heard from them. Call back number is [phone].

## 2023-02-02 NOTE — ED PROVIDER NOTES
"Encounter Date: 2/2/2023       History     Chief Complaint   Patient presents with    Palpitations     States palpitations and "heart racing" starting 2 weeks ago.  Has had 2 syncopal episodes due to same.  Was seen by ED and primary.  Is waiting on Cardiology Consult.  States tonight heart was "racing" and when lying down could "feel my heart racing".       51 y/o F presents with complaint of palpitations.  Pt reports she was seen at an outside ER for same thing 2 weeks ago. She reports at that time, she passed out. She reports today " I felt like I was going to pass out so I came to the ER. Pt reports she is waiting for a cardiology appt.  She reports she can make the palpitations go away slowly by " deep breathing".      Associated symptoms- pt denies CP, sob. Pt endorses dizziness, " feeling weird". Near syncope,   Modifying factors- none     The history is provided by the patient. No  was used.   Review of patient's allergies indicates:   Allergen Reactions    Toradol [ketorolac] Other (See Comments)     Hypotension reported     Past Medical History:   Diagnosis Date    Arthritis      Past Surgical History:   Procedure Laterality Date    TUMOR REMOVAL Bilateral 1988     History reviewed. No pertinent family history.  Social History     Tobacco Use    Smoking status: Never    Smokeless tobacco: Never   Substance Use Topics    Alcohol use: Yes    Drug use: Never     Review of Systems   Constitutional:  Negative for chills and fever.   HENT:  Negative for congestion and sore throat.    Eyes:  Negative for visual disturbance.   Respiratory:  Negative for cough and shortness of breath.    Cardiovascular:  Positive for palpitations. Negative for chest pain.   Gastrointestinal:  Negative for diarrhea, nausea and vomiting.   Genitourinary:  Negative for dysuria.   Musculoskeletal:  Negative for back pain and gait problem.   Skin:  Negative for rash and wound.   Neurological:  Negative for dizziness, " weakness, light-headedness and headaches.        Near syncope      Physical Exam     Initial Vitals [02/02/23 0411]   BP Pulse Resp Temp SpO2   115/69 67 17 98.2 °F (36.8 °C) 98 %      MAP       --         Physical Exam    Nursing note and vitals reviewed.  Constitutional: She appears well-developed and well-nourished. No distress.   HENT:   Head: Normocephalic and atraumatic.   Eyes: Conjunctivae are normal.   Cardiovascular:  Normal heart sounds and intact distal pulses.           Pulmonary/Chest: Breath sounds normal. No respiratory distress. She has no wheezes.   Abdominal: Abdomen is soft. Bowel sounds are normal. There is no abdominal tenderness. There is no rebound and no guarding.   Musculoskeletal:         General: Normal range of motion.     Neurological: She is alert and oriented to person, place, and time. She has normal strength. Gait normal. GCS score is 15. GCS eye subscore is 4. GCS verbal subscore is 5. GCS motor subscore is 6.   Skin: Skin is warm and dry. Capillary refill takes less than 2 seconds.   Psychiatric: She has a normal mood and affect. Her behavior is normal. Judgment and thought content normal.       ED Course   Procedures  Labs Reviewed   COMPREHENSIVE METABOLIC PANEL - Abnormal; Notable for the following components:       Result Value    Blood Urea Nitrogen 9.5 (*)     Creatinine 1.15 (*)     Alkaline Phosphatase 177 (*)     Alanine Aminotransferase 83 (*)     Aspartate Aminotransferase 48 (*)     All other components within normal limits   CBC WITH DIFFERENTIAL - Abnormal; Notable for the following components:    WBC 2.4 (*)     MPV 11.0 (*)     All other components within normal limits   MANUAL DIFFERENTIAL - Abnormal; Notable for the following components:    Neut Man 46 (*)     Lymph Man 48 (*)     Basophil Man 3 (*)     Abs Neut calc 1.104 (*)     Abs Mono 0.048 (*)     RBC Morph Abnormal (*)     Anisocyte 2+ (*)     Poik 1+ (*)     Polychrom 1+ (*)     Schistocyte 1+ (*)     All  other components within normal limits   TROPONIN I - Normal   TSH - Normal   CBC W/ AUTO DIFFERENTIAL    Narrative:     The following orders were created for panel order CBC Auto Differential.  Procedure                               Abnormality         Status                     ---------                               -----------         ------                     CBC with Differential[[id number]]        Abnormal            Final result               Manual Differential[[id number]]          Abnormal            Final result                 Please view results for these tests on the individual orders.   EXTRA TUBES    Narrative:     The following orders were created for panel order EXTRA TUBES.  Procedure                               Abnormality         Status                     ---------                               -----------         ------                     Light Blue Top Hold[[id number]]                                                         Red Top Hold[[id number]]                                                                  Please view results for these tests on the individual orders.   LIGHT BLUE TOP HOLD   RED TOP HOLD   HCG, SERUM, QUALITATIVE     EKG Readings: (Independently Interpreted)     My interpretation  EKG # 1- time- 0410-  NSR, 68, no st elevations,  no t wave inversions   EKG # 2- time- 0551-sinus bradycardia, 45, no st elevations,  no t wave inversions      ECG Results              EKG 12-lead (In process)  Result time 02/02/23 06:33:40      In process by Interface, Lab In [facility] (02/02/23 06:33:40)                   Narrative:    Test Reason : R00.1,    Vent. Rate : 045 BPM     Atrial Rate : 045 BPM     P-R Int : 150 ms          QRS Dur : 076 ms      QT Int : 476 ms       P-R-T Axes : 055 074 055 degrees     QTc Int : 411 ms    Sinus bradycardia  Otherwise normal ECG  When compared with ECG of 02-FEB-2023 04:10,  Vent. rate has decreased BY  23 BPM  The axis Shifted left  Criteria for  "Lateral infarct are no longer Present    Referred By: [email or web address]   SELF           Confirmed By:                                   Imaging Results              X-Ray Chest PA And Lateral (Final result)  Result time 02/02/23 06:51:46      Final result by Sandoval Carter MD (02/02/23 06:51:46)                   Impression:      No acute cardiopulmonary abnormality.      Electronically signed by: Sandoval Carter  Date:    02/02/2023  Time:    06:51               Narrative:    EXAMINATION:  XR CHEST PA AND LATERAL    CLINICAL HISTORY:  Palpitations    COMPARISON:  No priors    FINDINGS:  PA and lateral views of the chest were obtained. Heart and mediastinum within normal limits. The lungs are clear. No pneumothorax or significant effusion.                                       Medications   sodium chloride 0.9% flush 10 mL (has no administration in time range)   naloxone 0.4 mg/mL injection 0.02 mg (has no administration in time range)   glucose chewable tablet 16 g (has no administration in time range)   glucose chewable tablet 24 g (has no administration in time range)   glucagon (human recombinant) injection 1 mg (has no administration in time range)   dextrose 10% bolus 125 mL 125 mL (has no administration in time range)   dextrose 10% bolus 250 mL 250 mL (has no administration in time range)     Medical Decision Making:   Throughout ED stay - pt would have episodes of bradycardia. During these episodes pt reports " I feel dizzy and feel like I am going to pass out" Pt with symptomatic bradycardia. Reviewed all labs and imaging.   Discussed admission and pt agrees. Medicine consulted for admission.                          Clinical Impression:   Final diagnoses:  [R00.0] Rapid heart beat  [R00.2] Palpitations  [R00.1] Bradycardia  [R00.1] Symptomatic bradycardia (Primary)  [R55] Near syncope        ED Disposition Condition    Admit                 Warner Shafer MD  02/02/23 0712    "

## 2023-02-02 NOTE — H&P
"[facility] Medicine  History & Physical Note      Patient Name: Marialuisa Joseph  : 1970  MRN: [id number]  Patient Class: IP- Inpatient   Admission Date: 2023   Length of Stay: 0  Admitting Service: Hospital Medicine  Attending Physician:   PCP: Lisbeth Ocampo MD  Source of history: Patient, patient's family, and EMR.   Code status: Full    Chief Complaint   Palpitations (States palpitations and "heart racing" starting 2 weeks ago.  Has had 2 syncopal episodes due to same.  Was seen by ED and primary.  Is waiting on Cardiology Consult.  States tonight heart was "racing" and when lying down could "feel my heart racing".  )      History of Present Illness   Marialuisa Joseph is a 51 yo white female w/ PMHx of arthritis and migraines who presents to the ED for palpitations. She felt her heart racing and almost passed out. Patient reports that this has been going on for several weeks however, first episode of syncope in the 5th grade when singing during choir recital. Reports other syncope episodes in the 11th grade, 2016, and 2019. She went to another ER at a different facility about 2 weeks ago for similar complaints w/ 1 x syncope and currently is waiting for a full outpatient evaluation by cardiology. Symptoms are alleviated with "breathing deeply".  She states that these episodes can be brought on in times of stress or activity however, the last episode was while she was at rest. Denies lightheadedness when standing from seated position, decreased fluid intake, and feelings of anxiety. Denies chest pain, shortness of breath, trouble breathing, N/V/D, abdominal pain, fever, chills, changes in sleep, changes in appetite and changes in weight. Denies family hx of syncope. Denies childhood problems with menstruation.     Patient also reports progressively blurry vision over 1 month, denies prior episodes, as well as decreased , bilateral hip joint pain, and " "muscular leg pain. Reports always feeling cold and reports cold feet and hands constantly. Has hx of migraines but none recently. Reports generalized weakness, fatigue and increased sleep. Patient reports "being thin, tall with long fingers and feet" and low WBC count runs in the family. Middle sister was hospitalized for low WBC recently. Denies prior genetic workup.     Patient does not have scheduled home medications. Has known allergies to Toradol and becomes hypotensive when she last received it. Fhx of Lupus through youngest sister, AICD placement through great grandmother and great aunt, angina and goiter through maternal grandmother, and HTN through mother and maternal grandmother. Denies current and past tobacco use, EtOH use, and recreational drug use.     ED Course:   WBC 2.4 and ANC 1.104 on CBC Auto differential. Mildly Cr 1.15 and elevated LFTs on CMP. Troponin, TSH wnl. Negative serum HcG. EKG displays sinus bradycardia. CXR negative for enlarged cardiac silhouette and increased interstitial markings, not consistent with HF or cardiac remodeling. Medicine on call night team consulted for evaluation and admission.     ROS   Pertinent positive and negative as mentioned in HPI     Past Medical History     Past Medical History:   Diagnosis Date    Arthritis        Past Surgical History     Past Surgical History:   Procedure Laterality Date    TUMOR REMOVAL Bilateral 1988       Social History     Social History     Tobacco Use    Smoking status: Never    Smokeless tobacco: Never   Substance Use Topics    Alcohol use: Yes        Family History   Reviewed and noncontributory    Allergies   Toradol [ketorolac]    Home Medications     Prior to Admission medications    Medication Sig Start Date End Date Taking? Authorizing Provider   aluminum-magnesium hydroxide 200-200 mg/5 mL suspension Take 15 mLs by mouth every 6 (six) hours as needed for Indigestion.  Patient not taking: Reported on 1/31/2023 1/19/23 " 2/18/23  Geo Barrios MD   famotidine (PEPCID) 20 MG tablet Take 1 tablet (20 mg total) by mouth 2 (two) times daily.  Patient not taking: Reported on 1/31/2023 1/19/23 2/18/23  Geo Barrios MD        Inpatient Medications   Scheduled Meds    Continuous Infusions    PRN Meds  dextrose 10%, dextrose 10%, glucagon (human recombinant), glucose, glucose, naloxone, sodium chloride 0.9%    Physical Exam   Vital Signs  Temp:  [98.2 °F (36.8 °C)]   Pulse:  [47-88]   Resp:  [7-19]   BP: (115-137)/(62-78)   SpO2:  [94 %-100 %]       General: well-developed well-nourished in no acute distress, non-toxic appearing. Thin, slender female. BP normal, HR rapid fluctuations from 40s to 70s at rest.   Eye: PERRLA, EOMI, clear conjunctiva, eyelids normal  HENT: Oropharynx without erythema or exudate, oropharynx and nasal mucosal surfaces moist. No JVD. No hepatojugular reflux.   Neck: full range of motion, no lymphadenopathy  Respiratory: Clear to auscultation bilaterally. No crackles, wheezes, rhonchi, or rales. No tachypnea, no increased labor of breathing.   Cardiovascular: Bradycardia. regular rhythm. S1/S2 present. No murmurs, gallops or rubs appreciated.   Gastrointestinal: soft, non-tender, non-distended with normal bowel sounds  Musculoskeletal: full range of motion of all extremities and spine without limitation or discomfort.  Extremities: No peripheral edema, erythema, no palmar or plantar rash, no ulcers or skin lesions. No pain to palpation. Cafe au lait spots throughout body. Bruising to anterior distal RLE.  Feet are cold to touch. Distal pedal pulses 2+. Strength 5/5.  5/5.   Neurologic: Alert and oriented x3, no signs of peripheral neurological deficit, motor and sensory function intact   Psych: Cooperative    Labs     Recent Labs     02/02/23  0512   WBC 2.4*   RBC 4.75   HGB 13.6   HCT 40.7   MCV 85.7   MCH 28.6   MCHC 33.4   RDW 12.8        No results for input(s): LACTIC in the last 72  hours.  No results for input(s): INR, APTT, D-DIMER in the last 72 hours.  No results for input(s): HGBA1C, CHOL, TRIG, LDL, VLDL, HDL in the last 72 hours.  No results for input(s): PH, PCO2, PO2, HCO3, POCSATURATED, BE in the last 72 hours.    Recent Labs     02/02/23  0512      K 3.8   CHLORIDE 106   CO2 25   BUN 9.5*   CREATININE 1.15*   GLUCOSE 88   CALCIUM 9.6   ALBUMIN 3.6   GLOBULIN 3.4   ALKPHOS 177*   ALT 83*   AST 48*   BILITOT 1.3   TSH 2.242     Recent Labs     02/02/23  0512   TROPONINI <0.010          Microbiology Results (last 7 days)       ** No results found for the last 168 hours. **           Imaging     Imaging Results              X-Ray Chest PA And Lateral (Final result)  Result time 02/02/23 06:51:46      Final result by Sandoval Carter MD (02/02/23 06:51:46)                   Impression:      No acute cardiopulmonary abnormality.      Electronically signed by: Sandoval Carter  Date:    02/02/2023  Time:    06:51               Narrative:    EXAMINATION:  XR CHEST PA AND LATERAL    CLINICAL HISTORY:  Palpitations    COMPARISON:  No priors    FINDINGS:  PA and lateral views of the chest were obtained. Heart and mediastinum within normal limits. The lungs are clear. No pneumothorax or significant effusion.                                       EKG:   Results for orders placed or performed during the hospital encounter of 02/02/23   EKG 12-lead    Collection Time: 02/02/23  5:51 AM    Narrative    Test Reason : R00.1,    Vent. Rate : 045 BPM     Atrial Rate : 045 BPM     P-R Int : 150 ms          QRS Dur : 076 ms      QT Int : 476 ms       P-R-T Axes : 055 074 055 degrees     QTc Int : 411 ms    Sinus bradycardia  Otherwise normal ECG  When compared with ECG of 02-FEB-2023 04:10,  Vent. rate has decreased BY  23 BPM  The axis Shifted left  Criteria for Lateral infarct are no longer Present  Confirmed by Amelia Root MD ([id number]) on 2/2/2023 9:21:10 AM    Referred By: RODDY   SELF            Confirmed By:Amelia Root MD        Echo done on 2/2/23:   The left ventricle is normal in size with normal systolic function.  The estimated ejection fraction is 65%.  Normal left ventricular diastolic function.  Normal right ventricular size with normal right ventricular systolic function.  Mild right atrial enlargement.  Elevated central venous pressure (15 mmHg).  The estimated PA systolic pressure is 34 mmHg.  IVC is dilated and collapses < 50% with inspiration.       Assessment & Plan   PLAN:  Sinus Bradycardia   Hx Syncope   Palpitations  Fhx AICD placement  -several possible etiologies including but not limited to psychogenic vs. Anatomical cardiac abnormality vs. Autoimmune vs. Endocrine   -Echo ordered, negative for abnormalities   -Orthostatics  -Continuous cardiac monitoring  -Monitor VS q4h   -CBC, CMP, Mg, and Phos for daily AM draw; replete electrolytes as needed, keep K>4, Phos>3, and Mg>2   -Ordered HIV, Syphilis Ab, A1c, free T4   -Cardiology consult, pending recs    VALENTINA   -Cr/Bun in ED 1.15/9.5  -started LR infusion    Migraines  -hold NSAIDs for migraine treatment due to VALENTINA   -nursing to call if patient suffers from migraine while inpatient     FHx Autoimmune Disease   Fhx Neutropenia   -youngest sister diagnosed with Lupus; maternal grandmother s/p thyroid removal for goiter   -Per patient autoantibody workup completed with PCP  -consider further outpatient workup in the future       Access: Peripheral   Antibiotics: None  Diet: Heart healthy  DVT Prophylaxis: Heparin 5000u q12h   GI Prophylaxis: None  Fluids:  cc/hr     Dispo: 51 yo F w/ PMHx of arthritis and migraines presenting for sinus bradycardia and syncope admitted to telemetry for observation, cardiology consult pending. On IVF for VALENTINA.     Vini Hayes MD  [facility], -[facility]

## 2023-02-02 NOTE — PLAN OF CARE
PGY2 ADDENDUM:      Patient was seen in conjunction with intern, agree with assessment and plan on initial IM H&P with included inclusions and addendum. Of note, the patient is a 52 y.o. female with PMH of migraines. She presented to [facility] ED on 2/2/2023 with a primary complaint of presyncope and palpitations. She tells me that she has been having palpitations since 2017 but recently has started to have syncopal episodes. Approximately 2 weeks ago she had an episode of palpitations that lead to a syncopal episode. She states her friend witnessed her collapse while being all fours and apparently completely fainted and hit her head on the floor. Since that time she has been having palpitations with presyncope every other day now. When this occurs, she states she has to lie down. Was seen by her PCP after the event 2 weeks ago and was referred to a cardiologist but has not been seen as of yet. No tobacco use. No drug use. Very Occasional alcohol use. No heart disease in immediate family. Mothers mother with Pacemaker.     Physical Exam  Vitals and nursing note reviewed.   Constitutional:       General: She is not in acute distress.     Appearance: Normal appearance. She is not ill-appearing or toxic-appearing.   HENT:      Mouth/Throat:      Mouth: Mucous membranes are moist.      Pharynx: Oropharynx is clear.   Eyes:      Extraocular Movements: Extraocular movements intact.      Conjunctiva/sclera: Conjunctivae normal.      Pupils: Pupils are equal, round, and reactive to light.   Cardiovascular:      Rate and Rhythm: Normal rate and regular rhythm.      Pulses: Normal pulses.      Heart sounds: Normal heart sounds. No murmur heard.    No gallop.   Pulmonary:      Effort: Pulmonary effort is normal. No respiratory distress.      Breath sounds: Normal breath sounds. No stridor. No wheezing, rhonchi or rales.   Abdominal:      General: Bowel sounds are normal.      Palpations: Abdomen is soft.   Musculoskeletal:          General: No swelling.      Cervical back: Normal range of motion.      Right lower leg: No edema.      Left lower leg: No edema.   Skin:     General: Skin is warm and dry.   Neurological:      General: No focal deficit present.      Mental Status: She is alert and oriented to person, place, and time.         Assessment & Plan:     Bradycardia  Palpitations  Ethnic Neutropenia  VAL[place]  Hx of Migraines    No BB/CCB/cholinergics/electrolyte derangements/street drugs/hypoglycemia  EKG - sinus rosemary. No block. WI normal. Cont Tele monitoring  Check thyroid fxn.   Check for Syphilis and HIV  Echo. Orthostatics.  Possibly tachy rosemary syndrome vs anxiety - Consult Cardiology    Javier Gonzalez DO  [employer] [facility]

## 2023-02-02 NOTE — TELEPHONE ENCOUNTER
Pt had questions about her cardiology referral. I voiced it was done and can take up to 7 business days. She voiced thanks and understanding. Also voiced she is in the hospital for Symptomatic bradycardia.

## 2023-02-02 NOTE — Clinical Note
Diagnosis: Symptomatic bradycardia [181697]   Admitting Provider:: RITA DEL UNA [[id number]]   Future Attending Provider: RITA DE LUNA [[id number]]   Reason for IP Medical Treatment  (Clinical interventions that can only be accomplished in the IP setting? ) :: bradycardia   Estimated Length of Stay:: 2 midnights   I certify that Inpatient services for greater than or equal to 2 midnights are medically necessary:: No   Plans for Post-Acute care--if anticipated (pick the single best option):: A. No post acute care anticipated at this time

## 2023-02-02 NOTE — PLAN OF CARE
02/02/23 0958   Discharge Assessment   Assessment Type Discharge Planning Assessment   Confirmed/corrected address, phone number and insurance Yes   Confirmed Demographics Correct on Facesheet   Source of Information patient   When was your last doctors appointment?   (Lisbeth Ocampo)   Reason For Admission Symptomatic bradycardia   People in Home child(nagi), adult;spouse   Facility Arrived From: Home   Do you expect to return to your current living situation? Yes   Do you have help at home or someone to help you manage your care at home? Yes   Who are your caregiver(s) and their phone number(s)? Niranjan Joseph (Spouse)   [phone];  22 yr old son in home   Prior to hospitilization cognitive status: Alert/Oriented   Current cognitive status: Alert/Oriented   Equipment Currently Used at Home none   Readmission within 30 days? No   Patient currently being followed by outpatient case management? No   Do you currently have service(s) that help you manage your care at home? No   Do you take prescription medications? Yes  (Justen Tinoco[employer])   Do you have prescription coverage? Yes   Coverage [employer]   Do you have any problems affording any of your prescribed medications? No   Is the patient taking medications as prescribed? yes   Who is going to help you get home at discharge? Family   How do you get to doctors appointments? car, drives self   Are you on dialysis? No   Discharge Plan A Home with family   DME Needed Upon Discharge  none   Discharge Plan discussed with: Patient   Discharge Barriers Identified None   Physical Activity   On average, how many days per week do you engage in moderate to strenuous exercise (like a brisk walk)? 7 days   On average, how many minutes do you engage in exercise at this level? 60 min   Financial Resource Strain   How hard is it for you to pay for the very basics like food, housing, medical care, and heating? Not hard   Housing Stability   In the  last 12 months, was there a time when you were not able to pay the mortgage or rent on time? N   In the last 12 months, how many places have you lived? 1   In the last 12 months, was there a time when you did not have a steady place to sleep or slept in a shelter (including now)? N   Transportation Needs   In the past 12 months, has lack of transportation kept you from medical appointments or from getting medications? no   In the past 12 months, has lack of transportation kept you from meetings, work, or from getting things needed for daily living? No   Food Insecurity   Within the past 12 months, you worried that your food would run out before you got the money to buy more. Never true   Within the past 12 months, the food you bought just didn't last and you didn't have money to get more. Never true   Stress   Do you feel stress - tense, restless, nervous, or anxious, or unable to sleep at night because your mind is troubled all the time - these days? Not at all   Social Connections   In a typical week, how many times do you talk on the phone with family, friends, or neighbors? More than 3   How often do you get together with friends or relatives? More than 3   How often do you attend Rastafarian or Jew services? 1 to 4  (Christianity)   How often do you attend meetings of the clubs or organizations you belong to? Never   Are you , , , , never , or living with a partner?    Alcohol Use   Q1: How often do you have a drink containing alcohol? Monthly or l   Q2: How many drinks containing alcohol do you have on a typical day when you are drinking? 1 or 2   Q3: How often do you have six or more drinks on one occasion? Never   OTHER   Name(s) of People in Home Niranjan Joseph (Spouse)   [phone]; 22 yr old son     Pt independent with ADL's; Commutes to [place] for work where she spend half the week. Will follow for d/c planning needs.

## 2023-02-03 VITALS
HEART RATE: 60 BPM | SYSTOLIC BLOOD PRESSURE: 116 MMHG | DIASTOLIC BLOOD PRESSURE: 75 MMHG | RESPIRATION RATE: 18 BRPM | OXYGEN SATURATION: 100 % | TEMPERATURE: 98 F | BODY MASS INDEX: 23.34 KG/M2 | WEIGHT: 163 LBS | HEIGHT: 70 IN

## 2023-02-03 PROBLEM — R00.1 BRADYCARDIA: Status: ACTIVE | Noted: 2023-02-03

## 2023-02-03 PROBLEM — R00.2 PALPITATIONS: Status: ACTIVE | Noted: 2023-02-03

## 2023-02-03 PROBLEM — R55 NEAR SYNCOPE: Status: ACTIVE | Noted: 2023-02-03

## 2023-02-03 LAB
ALBUMIN SERPL-MCNC: 3.3 G/DL (ref 3.5–5)
ALBUMIN/GLOB SERPL: 1.1 RATIO (ref 1.1–2)
ALP SERPL-CCNC: 149 UNIT/L (ref 40–150)
ALT SERPL-CCNC: 55 UNIT/L (ref 0–55)
AST SERPL-CCNC: 26 UNIT/L (ref 5–34)
BASOPHILS # BLD AUTO: 0.02 X10(3)/MCL (ref 0–0.2)
BASOPHILS NFR BLD AUTO: 1.1 %
BILIRUBIN DIRECT+TOT PNL SERPL-MCNC: 1.6 MG/DL
BUN SERPL-MCNC: 8.4 MG/DL (ref 9.8–20.1)
CALCIUM SERPL-MCNC: 9.4 MG/DL (ref 8.4–10.2)
CHLORIDE SERPL-SCNC: 107 MMOL/L (ref 98–107)
CO2 SERPL-SCNC: 25 MMOL/L (ref 22–29)
CREAT SERPL-MCNC: 0.94 MG/DL (ref 0.55–1.02)
EOSINOPHIL # BLD AUTO: 0.04 X10(3)/MCL (ref 0–0.9)
EOSINOPHIL NFR BLD AUTO: 2.2 %
ERYTHROCYTE [DISTWIDTH] IN BLOOD BY AUTOMATED COUNT: 12.8 % (ref 11.5–17)
EST. AVERAGE GLUCOSE BLD GHB EST-MCNC: 88.2 MG/DL
GFR SERPLBLD CREATININE-BSD FMLA CKD-EPI: >60 MLS/MIN/1.73/M2
GLOBULIN SER-MCNC: 3.1 GM/DL (ref 2.4–3.5)
GLUCOSE SERPL-MCNC: 89 MG/DL (ref 74–100)
HBA1C MFR BLD: 4.7 %
HCT VFR BLD AUTO: 39.1 % (ref 37–47)
HGB BLD-MCNC: 13.3 GM/DL (ref 12–16)
IMM GRANULOCYTES # BLD AUTO: 0.01 X10(3)/MCL (ref 0–0.04)
IMM GRANULOCYTES NFR BLD AUTO: 0.5 %
LYMPHOCYTES # BLD AUTO: 0.92 X10(3)/MCL (ref 0.6–4.6)
LYMPHOCYTES NFR BLD AUTO: 50.5 %
MAGNESIUM SERPL-MCNC: 2 MG/DL (ref 1.6–2.6)
MCH RBC QN AUTO: 29.3 PG
MCHC RBC AUTO-ENTMCNC: 34 MG/DL (ref 33–36)
MCV RBC AUTO: 86.1 FL (ref 80–94)
MONOCYTES # BLD AUTO: 0.22 X10(3)/MCL (ref 0.1–1.3)
MONOCYTES NFR BLD AUTO: 12.1 %
NEUTROPHILS # BLD AUTO: 0.61 X10(3)/MCL (ref 2.1–9.2)
NEUTROPHILS NFR BLD AUTO: 33.6 %
NRBC BLD AUTO-RTO: 0 %
PHOSPHATE SERPL-MCNC: 2.9 MG/DL (ref 2.3–4.7)
PLATELET # BLD AUTO: 202 X10(3)/MCL (ref 130–400)
PMV BLD AUTO: 11.4 FL (ref 7.4–10.4)
POTASSIUM SERPL-SCNC: 4.1 MMOL/L (ref 3.5–5.1)
PROT SERPL-MCNC: 6.4 GM/DL (ref 6.4–8.3)
RBC # BLD AUTO: 4.54 X10(6)/MCL (ref 4.2–5.4)
SODIUM SERPL-SCNC: 139 MMOL/L (ref 136–145)
WBC # SPEC AUTO: 1.8 X10(3)/MCL (ref 4.5–11.5)

## 2023-02-03 PROCEDURE — G0378 HOSPITAL OBSERVATION PER HR: HCPCS

## 2023-02-03 PROCEDURE — 96372 THER/PROPH/DIAG INJ SC/IM: CPT

## 2023-02-03 PROCEDURE — 80053 COMPREHEN METABOLIC PANEL: CPT

## 2023-02-03 PROCEDURE — 85025 COMPLETE CBC W/AUTO DIFF WBC: CPT

## 2023-02-03 PROCEDURE — 83735 ASSAY OF MAGNESIUM: CPT

## 2023-02-03 PROCEDURE — 84100 ASSAY OF PHOSPHORUS: CPT

## 2023-02-03 PROCEDURE — 83036 HEMOGLOBIN GLYCOSYLATED A1C: CPT

## 2023-02-03 PROCEDURE — 63600175 PHARM REV CODE 636 W HCPCS

## 2023-02-03 RX ORDER — SODIUM CHLORIDE, SODIUM LACTATE, POTASSIUM CHLORIDE, CALCIUM CHLORIDE 600; 310; 30; 20 MG/100ML; MG/100ML; MG/100ML; MG/100ML
INJECTION, SOLUTION INTRAVENOUS CONTINUOUS
Status: CANCELLED | OUTPATIENT
Start: 2023-02-03

## 2023-02-03 RX ADMIN — HEPARIN SODIUM 5000 UNITS: 5000 INJECTION INTRAVENOUS; SUBCUTANEOUS at 08:02

## 2023-02-03 NOTE — PLAN OF CARE
Problem: Adult Inpatient Plan of Care  Goal: Plan of Care Review  Outcome: Ongoing, Progressing  Goal: Patient-Specific Goal (Individualized)  Outcome: Ongoing, Progressing  Goal: Absence of Hospital-Acquired Illness or Injury  Outcome: Ongoing, Progressing  Goal: Optimal Comfort and Wellbeing  Outcome: Ongoing, Progressing  Goal: Readiness for Transition of Care  Outcome: Ongoing, Progressing     Problem: Syncope  Goal: Absence of Syncopal Symptoms  Outcome: Ongoing, Progressing

## 2023-02-03 NOTE — PROGRESS NOTES
"[facility] Medicine  Progress Note      Patient Name: Marialuisa Joseph  : 1970  MRN: [id number]  Patient Class: OP- Observation   Admission Date: 2023   Length of Stay: 1  Admitting Service: Hospital Medicine  Attending Physician:   PCP: Lisbeth Ocampo MD  Source of history: Patient, patient's family, and EMR.   Code status: Full    Chief Complaint   Palpitations (States palpitations and "heart racing" starting 2 weeks ago.  Has had 2 syncopal episodes due to same.  Was seen by ED and primary.  Is waiting on Cardiology Consult.  States tonight heart was "racing" and when lying down could "feel my heart racing".  )      History of Present Illness   Marialuisa Joseph is a 53 yo white female w/ PMHx of arthritis and migraines who presents to the ED for palpitations. She felt her heart racing and almost passed out. Patient reports that this has been going on for several weeks however, first episode of syncope in the 5th grade when singing during choir recital. Reports other syncope episodes in the 11th grade, 2016, and 2019. She went to another ER at a different facility about 2 weeks ago for similar complaints w/ 1 x syncope and currently is waiting for a full outpatient evaluation by cardiology. Symptoms are alleviated with "breathing deeply".  She states that these episodes can be brought on in times of stress or activity however, the last episode was while she was at rest. Denies lightheadedness when standing from seated position, decreased fluid intake, and feelings of anxiety. Denies chest pain, shortness of breath, trouble breathing, N/V/D, abdominal pain, fever, chills, changes in sleep, changes in appetite and changes in weight. Denies family hx of syncope. Denies childhood problems with menstruation.     Patient also reports progressively blurry vision over 1 month, denies prior episodes, as well as decreased , bilateral hip joint pain, and " "muscular leg pain. Reports always feeling cold and reports cold feet and hands constantly. Has hx of migraines but none recently. Reports generalized weakness, fatigue and increased sleep. Patient reports "being thin, tall with long fingers and feet" and low WBC count runs in the family. Middle sister was hospitalized for low WBC recently. Denies prior genetic workup.     Patient does not have scheduled home medications. Has known allergies to Toradol and becomes hypotensive when she last received it. Fhx of Lupus through youngest sister, AICD placement through great grandmother and great aunt, angina and goiter through maternal grandmother, and HTN through mother and maternal grandmother. Denies current and past tobacco use, EtOH use, and recreational drug use.     ED Course:   WBC 2.4 and ANC 1.104 on CBC Auto differential. Mildly Cr 1.15 and elevated LFTs on CMP. Troponin, TSH wnl. Negative serum HcG. EKG displays sinus bradycardia. CXR negative for enlarged cardiac silhouette and increased interstitial markings, not consistent with HF or cardiac remodeling. Medicine on call night team consulted for evaluation and admission.     Interval Hx   NAEON. Steady bradycardia and soft BP throughout but VSS and AF otherwise. Patient reports this morning she does feel clammy and has palpitations intermittently. Cardiology fellow saw patient yesterday and provided recommendations. This AM labs still pending. Currently denies chest pain, SOB, trouble breathing, dizziness, LOC, seizures, fever, chills, nausea, vomiting, and diarrhea.     ROS   Pertinent positive and negative as mentioned in HPI     Past Medical History     Past Medical History:   Diagnosis Date    Arthritis        Past Surgical History     Past Surgical History:   Procedure Laterality Date    TUMOR REMOVAL Bilateral 1988       Social History     Social History     Tobacco Use    Smoking status: Never    Smokeless tobacco: Never   Substance Use Topics    " Alcohol use: Yes        Family History   Reviewed and noncontributory    Allergies   Toradol [ketorolac]    Home Medications     Prior to Admission medications    Medication Sig Start Date End Date Taking? Authorizing Provider   aluminum-magnesium hydroxide 200-200 mg/5 mL suspension Take 15 mLs by mouth every 6 (six) hours as needed for Indigestion.  Patient not taking: Reported on 1/31/2023 1/19/23 2/18/23  Geo Barrios MD   famotidine (PEPCID) 20 MG tablet Take 1 tablet (20 mg total) by mouth 2 (two) times daily.  Patient not taking: Reported on 1/31/2023 1/19/23 2/18/23  Geo Barrios MD        Inpatient Medications   Scheduled Meds   heparin (porcine)  5,000 Units Subcutaneous Q12H     Continuous Infusions   lactated ringers 75 mL/hr at 02/02/23 1849     PRN Meds  dextrose 10%, dextrose 10%, glucagon (human recombinant), glucose, glucose, melatonin, naloxone, sodium chloride 0.9%    Physical Exam   Vital Signs  Temp:  [97.5 °F (36.4 °C)-98.2 °F (36.8 °C)]   Pulse:  [50-51]   Resp:  [18]   BP: ()/(61-74)   SpO2:  [96 %-100 %]       General: well-developed well-nourished in no acute distress, non-toxic appearing. Thin, slender female. BP normal, HR rapid fluctuations from 40s to 70s at rest.   Eye: PERRLA, EOMI, clear conjunctiva, eyelids normal  HENT: Oropharynx without erythema or exudate, oropharynx and nasal mucosal surfaces moist. No JVD. No hepatojugular reflux.   Neck: full range of motion, no lymphadenopathy  Respiratory: Clear to auscultation bilaterally. No crackles, wheezes, rhonchi, or rales. No tachypnea, no increased labor of breathing.   Cardiovascular: Bradycardia. regular rhythm. S1/S2 present. No murmurs, gallops or rubs appreciated.   Gastrointestinal: soft, non-tender, non-distended with normal bowel sounds  Musculoskeletal: full range of motion of all extremities and spine without limitation or discomfort.  Extremities: No peripheral edema, erythema, no palmar or plantar rash,  no ulcers or skin lesions. No pain to palpation. Cafe au lait spots throughout body. Bruising to anterior distal RLE.  Feet are cold to touch. Distal pedal pulses 2+. Strength 5/5.  5/5.   Neurologic: Alert and oriented x3, no signs of peripheral neurological deficit, motor and sensory function intact   Psych: Cooperative    Labs     Recent Labs     02/02/23  0512   WBC 2.4*   RBC 4.75   HGB 13.6   HCT 40.7   MCV 85.7   MCH 28.6   MCHC 33.4   RDW 12.8        No results for input(s): LACTIC in the last 72 hours.  No results for input(s): INR, APTT, D-DIMER in the last 72 hours.  Recent Labs     02/03/23  0410   HGBA1C 4.7     No results for input(s): PH, PCO2, PO2, HCO3, POCSATURATED, BE in the last 72 hours.    Recent Labs     02/02/23  0512      K 3.8   CHLORIDE 106   CO2 25   BUN 9.5*   CREATININE 1.15*   GLUCOSE 88   CALCIUM 9.6   ALBUMIN 3.6   GLOBULIN 3.4   ALKPHOS 177*   ALT 83*   AST 48*   BILITOT 1.3   TSH 2.242     Recent Labs     02/02/23  0512   TROPONINI <0.010          Microbiology Results (last 7 days)       ** No results found for the last 168 hours. **           Imaging     Imaging Results              X-Ray Chest PA And Lateral (Final result)  Result time 02/02/23 06:51:46      Final result by Sandoval Carter MD (02/02/23 06:51:46)                   Impression:      No acute cardiopulmonary abnormality.      Electronically signed by: Sandoval Carter  Date:    02/02/2023  Time:    06:51               Narrative:    EXAMINATION:  XR CHEST PA AND LATERAL    CLINICAL HISTORY:  Palpitations    COMPARISON:  No priors    FINDINGS:  PA and lateral views of the chest were obtained. Heart and mediastinum within normal limits. The lungs are clear. No pneumothorax or significant effusion.                                       EKG:   Results for orders placed or performed during the hospital encounter of 02/02/23   EKG 12-lead    Collection Time: 02/02/23  5:51 AM    Narrative    Test Reason :  R00.1,    Vent. Rate : 045 BPM     Atrial Rate : 045 BPM     P-R Int : 150 ms          QRS Dur : 076 ms      QT Int : 476 ms       P-R-T Axes : 055 074 055 degrees     QTc Int : 411 ms    Sinus bradycardia  Otherwise normal ECG  When compared with ECG of 02-FEB-2023 04:10,  Vent. rate has decreased BY  23 BPM  The axis Shifted left  Criteria for Lateral infarct are no longer Present  Confirmed by Amelia Root MD ([id number]) on 2/2/2023 9:21:10 AM    Referred By: RODDY   SELF           Confirmed By:Amelia Root MD        Echo done on 2/2/23:   The left ventricle is normal in size with normal systolic function.  The estimated ejection fraction is 65%.  Normal left ventricular diastolic function.  Normal right ventricular size with normal right ventricular systolic function.  Mild right atrial enlargement.  Elevated central venous pressure (15 mmHg).  The estimated PA systolic pressure is 34 mmHg.  IVC is dilated and collapses < 50% with inspiration.       Assessment & Plan   PLAN:  Sinus Bradycardia   Hx Syncope   Palpitations  Fhx AICD placement  -several possible etiologies including but not limited to psychogenic vs. Sympathetic vs Anatomical cardiac abnormality vs. Autoimmune vs. Endocrine   -Echo ordered, negative for abnormalities   -Orthostatics  -Continuous cardiac monitoring  -Monitor VS q4h   -CBC, CMP, Mg, and Phos for daily AM draw; replete electrolytes as needed, keep K>4, Phos>3, and Mg>2   -Ordered HIV, Syphilis Ab, A1c, free T4   -Cardiology consult: no need for further inpatient workup  30 day outpatient holter with cardiology clinic f/u in 6-8 weeks   64 fl oz intake minimum   Instructed to bear down, squat, flex muscles, or lay down when she feels episode coming on     VALENTINA   -Cr/Bun in ED 1.15/9.5  -started LR infusion    Migraines  -hold NSAIDs for migraine treatment due to VALENTINA   -nursing to call if patient suffers from migraine while inpatient     FHx Autoimmune Disease   Fhx Neutropenia    -youngest sister diagnosed with Lupus; maternal grandmother s/p thyroid removal for goiter   -Per patient autoantibody workup completed with PCP  -consider further outpatient workup in the future       Access: Peripheral   Antibiotics: None  Diet: Heart healthy  DVT Prophylaxis: Heparin 5000u q12h   GI Prophylaxis: None  Fluids: LR 75cc/hr     Dispo: 51 yo F w/ PMHx of arthritis and migraines presenting for sinus bradycardia and syncope admitted to telemetry for observation, cardiology consult completed and recommendation include outpatient f/u after 30 day holter. On IVF for VALENTINA. Today's labs pending. Most likely ready for d/c today or tomorrow.     Vini Hayes MD  [facility], -I

## 2023-02-03 NOTE — CONSULTS
"[facility]  Cardiology Inpatient Consultation    2/2/2023  6:28 PM    Attending Cardiologist: Dr. Root  Cardiology Fellow: Rivas Martin MD  Chief Compliant/Reason for Consult: Presyncope    HPI:   Marialuisa Joseph is a 52 y.o. female with no significant PMH presents with an episode of presyncope. 2 weeks ago patient had an episode of true syncope. She woke up in the middle of the night, felt abdominal pain, urge to defecate, warmth, diaphoresis, and nausea. She passed out and fell on floor. Wake up tachycardic and remembered full event. Went to toilet and had 2 presyncope events on toilet. She presented to ED and was sent home with outpatient cardiology followup given diagnosis of vasovagal syncope. Patient has had episodes of "palpitations" every other day since that episode. She has had no episodes of syncope since then but feels presyncope. When she feels symptoms she lays down on floor and raises her legs and feels better. She states that she checks her HR on her apple watch and it goes up to 90s-100 when she feels symptoms. Her resting heart rate is in the 40s for > 1yr on her apple watch. She is quite active and does HIIT. She runs. She does not drink much water due to a busy job. She has had a few episodes like this with prodrome throughout her life and they are very similar. She is admitted with presyncope and cardiology consulted for this.    Past Medical History:  Past Medical History:   Diagnosis Date    Arthritis        Past Surgical History:  Past Surgical History:   Procedure Laterality Date    TUMOR REMOVAL Bilateral 1988       Family History:  History reviewed. No pertinent family history.    Social History:  Social History     Socioeconomic History    Marital status:    Occupational History    Occupation: Researcher   Tobacco Use    Smoking status: Never    Smokeless tobacco: Never   Substance and Sexual Activity    Alcohol use: Yes    Drug use: Never    Sexual activity: " Yes     Social Determinants of Health     Financial Resource Strain: Low Risk     Difficulty of Paying Living Expenses: Not hard at all   Food Insecurity: No Food Insecurity    Worried About Running Out of Food in the Last Year: Never true    Ran Out of Food in the Last Year: Never true   Transportation Needs: No Transportation Needs    Lack of Transportation (Medical): No    Lack of Transportation (Non-Medical): No   Physical Activity: Sufficiently Active    Days of Exercise per Week: 7 days    Minutes of Exercise per Session: 60 min   Stress: No Stress Concern Present    Feeling of Stress : Not at all   Social Connections: Moderately Integrated    Frequency of Communication with Friends and Family: More than three times a week    Frequency of Social Gatherings with Friends and Family: More than three times a week    Attends Mormonism Services: 1 to 4 times per year    Attends Club or Organization Meetings: Never    Marital Status:    Housing Stability: Low Risk     Unable to Pay for Housing in the Last Year: No    Number of Places Lived in the Last Year: 1    Unstable Housing in the Last Year: No       Allergies:   Review of patient's allergies indicates:   Allergen Reactions    Toradol [ketorolac] Other (See Comments)     Hypotension reported       Hospital Medications:  Prior to Admission medications    Medication Sig Start Date End Date Taking? Authorizing Provider   aluminum-magnesium hydroxide 200-200 mg/5 mL suspension Take 15 mLs by mouth every 6 (six) hours as needed for Indigestion.  Patient not taking: Reported on 1/31/2023 1/19/23 2/18/23  Geo Barrios MD   famotidine (PEPCID) 20 MG tablet Take 1 tablet (20 mg total) by mouth 2 (two) times daily.  Patient not taking: Reported on 1/31/2023 1/19/23 2/18/23  Geo Barrios MD       Review of Systems:  Up to 10 point review of systems is negative unless noted in HPI or overall note    PHYSICAL EXAM:  /69   Pulse (!) 50   Temp 97.7 °F  "(36.5 °C) (Oral)   Resp 16   Ht 5' 10" (1.778 m)   Wt 73.9 kg (163 lb)   LMP 01/23/2023 (Exact Date) Comment: Spotting having periods every few months  SpO2 100%   Breastfeeding No   BMI 23.39 kg/m²   General: Alert and oriented. No acute distress. Speaking in full sentences.   HEENT: Normocephalic. Atraumatic  Neck: Supple. No JVD. No carotid bruits.   Heart: Bradycardia. S1 and S2 heard. No murmurs. No pitting edema BLE.  Lungs: CTABL. Nonlabored respirations. No supplemental O2  GI: No tenderness or distention  : No braswell  Skin: No venous stasis changes in BLE.   MSK: No deformities  Neuro: Alert    CARDIOVASCULAR STUDIES:  Pertinent cardiovascular studies including labs, imaging, cath reports, echo reports, CT, cMRI, etc were independently reviewed in this patients care and reviewed below as needed.     ASSESSMENT:    Marialuisa Joseph is a 52 y.o. female with a no significant PMH presents with presyncope. Her symptoms and clinical history is most significant with vasovagal syncope. She appears to have a high resting vagal tone. Echo with normal biventricular EF and no valvular disease (mild AI) to explain symptoms. EKG with sinus bradycardia. Appears to have appropriate chronotropic response on inpatient telemetry and apple watch review (>1 yr data). Patient appears to have some anxiety in relation to her HR and monitoring HR on apple watch.     PLAN/RECOMMENDATIONS:    - No additional inpatient cardiovascular workup needed. Can discharge patient with 30 day holter monitor.   - Recommend 64 oz fluid intake daily minimum  - When patient has these presyncopal, prodromal symptoms, have instructed her to bear down, flex muscles, squat, or lay down.   - Avoid AV helio blocking agents given baseline high vagal tone and resting bradycardia  - Can followup in cardiology clinic in 6-8 weeks after holter monitor complete     Rivas Martin MD   [facility] Cardiology Fellow  [facility] "

## 2023-02-03 NOTE — DISCHARGE SUMMARY
"[facility] Discharge Summary    Admitting Physician: Jalyn Mcfadden MD  Attending Physician: Jalyn Mcfadden MD  Date of Admit: 2/2/2023  Date of Discharge: 2/3/2023    Condition: Stable  Outcome: Condition has improved and patient is now ready for discharge.  DISPOSITION: Home or Self Care          Discharge Diagnoses     Patient Active Problem List   Diagnosis    Palpitations    Bradycardia    Near syncope       Principal Problem:  Palpitations    Consultants and Procedures     Consultants:  IP CONSULT TO INTERNAL MEDICINE  IP CONSULT TO CARDIOLOGY      Brief Admission History      Marialuisa Joseph is a 53 yo white female w/ PMHx of arthritis and migraines who presents to the ED for palpitations. She felt her heart racing and almost passed out. Patient reports that this has been going on for several weeks however, first episode of syncope in the 5th grade when singing during choir recital. Reports other syncope episodes in the 11th grade, 2016, and 2019. She went to another ER at a different facility about 2 weeks ago for similar complaints w/ 1 x syncope and currently is waiting for a full outpatient evaluation by cardiology. Symptoms are alleviated with "breathing deeply".  She states that these episodes can be brought on in times of stress or activity however, the last episode was while she was at rest. Denies lightheadedness when standing from seated position, decreased fluid intake, and feelings of anxiety. Denies chest pain, shortness of breath, trouble breathing, N/V/D, abdominal pain, fever, chills, changes in sleep, changes in appetite and changes in weight. Denies family hx of syncope. Denies childhood problems with menstruation.      Patient also reports progressively blurry vision over 1 month, denies prior episodes, as well as decreased , bilateral hip joint pain, and muscular leg pain. Reports always feeling cold and reports cold feet and hands constantly. Has hx of migraines " "but none recently. Reports generalized weakness, fatigue and increased sleep. Patient reports "being thin, tall with long fingers and feet" and low WBC count runs in the family. Middle sister was hospitalized for low WBC recently. Denies prior genetic workup.      Patient does not have scheduled home medications. Has known allergies to Toradol and becomes hypotensive when she last received it. Fhx of Lupus through youngest sister, AICD placement through great grandmother and great aunt, angina and goiter through maternal grandmother, and HTN through mother and maternal grandmother. Denies current and past tobacco use, EtOH use, and recreational drug use.      ED Course:   WBC 2.4 and ANC 1.104 on CBC Auto differential. Mildly Cr 1.15 and elevated LFTs on CMP. Troponin, TSH wnl. Negative serum HcG. EKG displays sinus bradycardia. CXR negative for enlarged cardiac silhouette and increased interstitial markings, not consistent with HF or cardiac remodeling. Medicine on call night team consulted for evaluation and admission.     Hospital Course with Pertinent Findings     NAEON. Steady bradycardia and soft BP throughout but VSS and AF otherwise. Patient reports this morning she does feel clammy and has palpitations intermittently. Cardiology fellow saw patient yesterday and provided recommendations, did report she was very anxious about her symptoms. This AM labs consistent with familial neutropenia. Currently denies chest pain, SOB, trouble breathing, dizziness, LOC, seizures, fever, chills, nausea, vomiting, and diarrhea.     Discharge physical exam:  Vitals  BP: 116/75  Temp: 97.6 °F (36.4 °C)  Temp src: Oral  Pulse: 60  Resp: 18  SpO2: 100 %  Height: 5' 10" (177.8 cm)  Weight: 73.9 kg (163 lb)    General: well-developed well-nourished in no acute distress, non-toxic appearing. Thin, slender female. BP normal, HR rapid fluctuations from 40s to 70s at rest.   Eye: PERRLA, EOMI, clear conjunctiva, eyelids normal  HENT: " Oropharynx without erythema or exudate, oropharynx and nasal mucosal surfaces moist. No JVD. No hepatojugular reflux.   Neck: full range of motion, no lymphadenopathy  Respiratory: Clear to auscultation bilaterally. No crackles, wheezes, rhonchi, or rales. No tachypnea, no increased labor of breathing.   Cardiovascular: Bradycardia. regular rhythm. S1/S2 present. No murmurs, gallops or rubs appreciated.   Gastrointestinal: soft, non-tender, non-distended with normal bowel sounds  Musculoskeletal: full range of motion of all extremities and spine without limitation or discomfort.  Extremities: No peripheral edema, erythema, no palmar or plantar rash, no ulcers or skin lesions. No pain to palpation. Cafe au lait spots throughout body. Bruising to anterior distal RLE.  Feet are cold to touch. Distal pedal pulses 2+. Strength 5/5.  5/5.   Neurologic: Alert and oriented x3, no signs of peripheral neurological deficit, motor and sensory function intact   Psych: Cooperative    TIME SPENT ON DISCHARGE: 60 minutes    Discharge Medications        Medication List        STOP taking these medications      aluminum-magnesium hydroxide 200-200 mg/5 mL suspension     famotidine 20 MG tablet  Commonly known as: PEPCID              Discharge Information:      Follow-up Information       Lisbeth Ocampo MD. Schedule an appointment as soon as possible for a visit in 1 week(s).    Specialty: Family Medicine  Contact information:  [address] [address]  [address] [place] [address] [id number] [phone]               [facility] - Emergency Dept. Go to.    Specialty: Emergency Medicine  Why: If symptoms worsen  Contact information:  [address] [facility] [address] [phone]                            Vini Hayes MD  [facility] -1

## 2023-02-06 ENCOUNTER — TELEPHONE (OUTPATIENT)
Dept: FAMILY MEDICINE | Facility: CLINIC | Age: 53
End: 2023-02-06
Payer: COMMERCIAL

## 2023-02-06 ENCOUNTER — HOSPITAL ENCOUNTER (OUTPATIENT)
Dept: RADIOLOGY | Facility: HOSPITAL | Age: 53
Discharge: HOME | End: 2023-02-06
Attending: FAMILY MEDICINE
Payer: COMMERCIAL

## 2023-02-06 DIAGNOSIS — R10.9 ABDOMINAL PAIN, UNSPECIFIED ABDOMINAL LOCATION: Primary | ICD-10-CM

## 2023-02-06 DIAGNOSIS — R42 DIZZINESS AND GIDDINESS: ICD-10-CM

## 2023-02-06 PROCEDURE — 70450 CT HEAD/BRAIN W/O DYE: CPT | Mod: TC

## 2023-02-06 NOTE — TELEPHONE ENCOUNTER
Spoke to patient about results, was confused as the test was for a CT on the head, but instead was reported for a mammogram. Please advise.

## 2023-02-06 NOTE — TELEPHONE ENCOUNTER
Spoke to patient about concerns about her heart monitor. Pt reported had not received her heart monitor yet. Wanted to know how to follow up with. Told her to check back with the hospital incase she does not receive it all all today or mid-tomorrow. Pt voiced understanding    Patient also reported stomach pain. Says it makes her feel as though she has to go to bathroom constantly which leaves her restless. Stomach gets upset after she eats. Wanted to know what her PCP would suggest. Please advise.

## 2023-02-07 NOTE — TELEPHONE ENCOUNTER
Spoke to patient about her concerns on her cardiac machine and informed her about her referral to the gastro, voiced understanding

## 2023-02-15 ENCOUNTER — HOSPITAL ENCOUNTER (OUTPATIENT)
Dept: CARDIOLOGY | Facility: HOSPITAL | Age: 53
Discharge: HOME | End: 2023-02-15
Attending: INTERNAL MEDICINE
Payer: COMMERCIAL

## 2023-02-15 DIAGNOSIS — R00.1 SYMPTOMATIC BRADYCARDIA: ICD-10-CM

## 2023-02-15 PROCEDURE — 93229 REMOTE 30 DAY ECG TECH SUPP: CPT

## 2023-03-16 ENCOUNTER — OFFICE VISIT (OUTPATIENT)
Dept: CARDIOLOGY | Facility: CLINIC | Age: 53
End: 2023-03-16
Payer: COMMERCIAL

## 2023-03-16 VITALS
OXYGEN SATURATION: 100 % | HEART RATE: 68 BPM | SYSTOLIC BLOOD PRESSURE: 102 MMHG | HEIGHT: 70 IN | DIASTOLIC BLOOD PRESSURE: 67 MMHG | BODY MASS INDEX: 22.79 KG/M2 | RESPIRATION RATE: 20 BRPM | TEMPERATURE: 98 F | WEIGHT: 159.19 LBS

## 2023-03-16 DIAGNOSIS — R55 SYNCOPE, UNSPECIFIED SYNCOPE TYPE: ICD-10-CM

## 2023-03-16 DIAGNOSIS — R00.1 SYMPTOMATIC BRADYCARDIA: ICD-10-CM

## 2023-03-16 DIAGNOSIS — R42 DIZZINESS: ICD-10-CM

## 2023-03-16 DIAGNOSIS — R00.2 PALPITATIONS: ICD-10-CM

## 2023-03-16 DIAGNOSIS — R94.31 ABNORMAL EKG: ICD-10-CM

## 2023-03-16 PROCEDURE — 99215 OFFICE O/P EST HI 40 MIN: CPT | Mod: PBBFAC | Performed by: INTERNAL MEDICINE

## 2023-03-16 NOTE — PROGRESS NOTES
[facility] Cardiology Clinic Note     Date of Visit: 3/16/2023  Reason for Visit/Chief Complaint:   Chief Complaint    referral, echo and  monitor results; denies chest pains, c/o occass. bouts of sob; Palpitations; Dizziness          The patient was discussed with Dr. Martinez who agrees with the assessment and plan.    History of Present Illness:      Marialuisa Joseph is a 52 y.o. female with a PMH significant for migraines, palpitations, syncope, arthritis who presents to cardiology clinic as a new patient after being referred for syncope. She was recently admitted to [facility] after presenting to ED for near-syncope in 2/2023. Prior to this, she describes several episodes of actual syncope. She describes on episode of syncope as waking up in the middle of the night, feeling abdominal pain with urge to defecate, warmth, and nausea. She says she fell to the floor and lost consciousness during that episode. She describes prodromal and post-ictal symptoms as well only related to this event, says she had near-bowel incontinence and was confused afterwards, she does not remember the events immediately afterwards, and she had bruising all over her body. During her admission, her telemetry was monitored and apple watch data was reviewed and she appears to have appropriate chronotropic response. Since that admission she was prescribed a 30-day Holter monitor and has had recent episodes of presyncope consistent with vasovagal syncope. Otherwise, she denies headaches, palpitations, chest pain, dyspnea, orthopnea. She reports mildly decreased exercise tolerance over the past few months. She takes no medications.    Past Medical History:     Past Medical History:   Diagnosis Date    Arthritis     Syncope and collapse        Surgical History:     Past Surgical History:   Procedure Laterality Date    LUMPECTOMY, BREAST Bilateral        Family History:     Family History   Problem Relation Age of Onset    Heart disease Maternal  "Grandmother        Social History:     Social History     Tobacco Use    Smoking status: Never    Smokeless tobacco: Never   Substance Use Topics    Alcohol use: Yes     Comment: social    Drug use: Never       Allergies:     Review of patient's allergies indicates:   Allergen Reactions    Toradol [ketorolac] Other (See Comments)     Hypotension reported       Review of Systems:   Review of Systems   Constitutional:  Negative for chills and fever.   HENT:  Negative for congestion and sinus pain.    Eyes:  Negative for double vision.   Respiratory:  Negative for cough, shortness of breath and wheezing.    Cardiovascular:  Negative for chest pain, palpitations, orthopnea and leg swelling.   Gastrointestinal:  Negative for abdominal pain, constipation, diarrhea, nausea and vomiting.   Genitourinary:  Negative for dysuria and hematuria.   Musculoskeletal:  Negative for joint pain and myalgias.   Skin:  Negative for rash.   Neurological:  Positive for dizziness and loss of consciousness. Negative for weakness.   Psychiatric/Behavioral:  The patient is not nervous/anxious.       Objective:     Wt Readings from Last 3 Encounters:   03/16/23 72.2 kg (159 lb 2.8 oz)   02/02/23 73.9 kg (163 lb)   01/31/23 73.4 kg (161 lb 12.8 oz)     Temp Readings from Last 3 Encounters:   03/16/23 97.8 °F (36.6 °C) (Oral)   02/03/23 97.6 °F (36.4 °C)   01/19/23 97.9 °F (36.6 °C) (Oral)     BP Readings from Last 3 Encounters:   03/16/23 102/67   02/03/23 116/75   01/31/23 111/66     Pulse Readings from Last 3 Encounters:   03/16/23 68   02/03/23 60   01/31/23 (!) 51       Vitals:    03/16/23 1412   BP: 102/67   BP Location: Left arm   Patient Position: Sitting   BP Method: X-Large (Automatic)   Pulse: 68   Resp: 20   Temp: 97.8 °F (36.6 °C)   TempSrc: Oral   SpO2: 100%   Weight: 72.2 kg (159 lb 2.8 oz)   Height: 5' 9.5" (1.765 m)     Body mass index is 23.17 kg/m².    General: Patient resting comfortably, in no acute distress   HENT: " Head-normocephalic and atraumatic  Neck: no JVD, trachea midline  Respiratory: clear to auscultation bilaterally  Cardiovascular: regular rate and rhythm without murmurs  Gastrointestinal: soft, non-tender, non-distended   Musculoskeletal: full range of motion without limitation or discomfort  Integumentary: no rashes or skin lesions present  Neurologic: no signs of peripheral neurological deficit  Psychiatric:  alert and oriented, cognitive function intact, cooperative with exam      Cardiac Studies/Imaging:   I have reviewed the following studies below:      EKG (2/2/23):  Sinus bradycardia    Echo (2/2/23):  The left ventricle is normal in size with normal systolic function.  The estimated ejection fraction is 65%.  Normal left ventricular diastolic function.  Normal right ventricular size with normal right ventricular systolic function.  Mild right atrial enlargement.  Elevated central venous pressure (15 mmHg).  The estimated PA systolic pressure is 34 mmHg.  IVC is dilated and collapses < 50% with inspiration.    30 day telemetry: reviewed with Dr. Martinez. Negative.      Assessment/Plan:   52 y.o. female with the following medical problems:  Migraines  Syncope with prodromal symptoms    - Will refer to neurology, recommend sleep-deprived EEG  - 1-2 episodes of syncope suspicious for seizure like activity, see HPI  - Pt admits water intake could be better, encouraged to stay hydrated  - 30-day holter results reviewed today, max heart rate 107, min heart rate 47, no arrhythmias noted during time of symptoms, I do not assume she is having cardiac syncope  - Echo performed during admission normal   - BP slightly low today, 102/67, encouraged pt to take bp during episodes to see if hypotension is contributing     Return to clinic in 6 mos.      Future Appointments   Date Time Provider Department Center   4/25/2023  9:00 AM Lisbeth Ocampo MD [facility] CHARLES Lind DO  [facility]  [facility]

## 2023-03-16 NOTE — PROGRESS NOTES
Cardiology Attending    I evaluated Marialuisa Joseph and discussed the patient's symptoms, findings, and management plan with the resident.  Please see the Cardiology note for details.

## 2023-04-12 ENCOUNTER — TELEPHONE (OUTPATIENT)
Dept: FAMILY MEDICINE | Facility: CLINIC | Age: 53
End: 2023-04-12
Payer: COMMERCIAL

## 2023-04-12 NOTE — TELEPHONE ENCOUNTER
----- Message from Marko Griffin sent at 4/12/2023 11:24 AM CDT -----  .Type:  Needs Medical Advice    Who Called: Marialuisa  Symptoms (please be specific):    How long has patient had these symptoms:    Pharmacy name and phone #:    Would the patient rather a call back or a response via [facility]?   Best Call Back Number: [phone]  Additional Information: She would like a call back from the doctor as she is looking to get a RTW document showing she can go back to work.

## 2023-04-20 ENCOUNTER — TELEPHONE (OUTPATIENT)
Dept: FAMILY MEDICINE | Facility: CLINIC | Age: 53
End: 2023-04-20
Payer: COMMERCIAL

## 2023-04-20 NOTE — TELEPHONE ENCOUNTER
Spoken with pt, she is still having symptoms. Not enough to go to the ER but she still feeling like she cant breathe during the night. She is still having a racing heart and a tingling sensation. She wakes up 3 or 4 times a night. Pt doesn't feel safe enough to drive back and forth to work right now

## 2023-04-20 NOTE — TELEPHONE ENCOUNTER
----- Message from Marko Griffin sent at 4/20/2023  3:04 PM CDT -----  .Type:  Needs Medical Advice    Who Called: Marialuisa  Symptoms (please be specific):    How long has patient had these symptoms:    Pharmacy name and phone #:    Would the patient rather a call back or a response via [facility]?   Best Call Back Number: [phone]  Additional Information: She is requesting to speak with the doctor about a rtw paper that she will need. Please give her call back.

## 2023-04-21 ENCOUNTER — TELEPHONE (OUTPATIENT)
Dept: FAMILY MEDICINE | Facility: CLINIC | Age: 53
End: 2023-04-21
Payer: COMMERCIAL

## 2023-04-25 ENCOUNTER — OFFICE VISIT (OUTPATIENT)
Dept: FAMILY MEDICINE | Facility: CLINIC | Age: 53
End: 2023-04-25
Payer: COMMERCIAL

## 2023-04-25 VITALS
SYSTOLIC BLOOD PRESSURE: 121 MMHG | RESPIRATION RATE: 18 BRPM | BODY MASS INDEX: 24.66 KG/M2 | HEART RATE: 64 BPM | DIASTOLIC BLOOD PRESSURE: 79 MMHG | HEIGHT: 69 IN | OXYGEN SATURATION: 99 % | WEIGHT: 166.5 LBS

## 2023-04-25 DIAGNOSIS — R55 PRE-SYNCOPE: ICD-10-CM

## 2023-04-25 DIAGNOSIS — D57.3 SICKLE CELL TRAIT: ICD-10-CM

## 2023-04-25 DIAGNOSIS — Z00.00 WELLNESS EXAMINATION: Primary | ICD-10-CM

## 2023-04-25 DIAGNOSIS — F41.1 GENERALIZED ANXIETY DISORDER: ICD-10-CM

## 2023-04-25 DIAGNOSIS — Z12.31 VISIT FOR SCREENING MAMMOGRAM: ICD-10-CM

## 2023-04-25 DIAGNOSIS — D70.9 NEUTROPENIA, UNSPECIFIED TYPE: ICD-10-CM

## 2023-04-25 DIAGNOSIS — Z12.11 SCREENING FOR COLON CANCER: ICD-10-CM

## 2023-04-25 PROCEDURE — 99396 PR PREVENTIVE VISIT,EST,40-64: ICD-10-PCS | Mod: 25,,, | Performed by: FAMILY MEDICINE

## 2023-04-25 PROCEDURE — 3008F PR BODY MASS INDEX (BMI) DOCUMENTED: ICD-10-PCS | Mod: CPTII,,, | Performed by: FAMILY MEDICINE

## 2023-04-25 PROCEDURE — 99396 PREV VISIT EST AGE 40-64: CPT | Mod: 25,,, | Performed by: FAMILY MEDICINE

## 2023-04-25 PROCEDURE — 99214 OFFICE O/P EST MOD 30 MIN: CPT | Mod: 25,,, | Performed by: FAMILY MEDICINE

## 2023-04-25 PROCEDURE — 1159F PR MEDICATION LIST DOCUMENTED IN MEDICAL RECORD: ICD-10-PCS | Mod: CPTII,,, | Performed by: FAMILY MEDICINE

## 2023-04-25 PROCEDURE — 1160F RVW MEDS BY RX/DR IN RCRD: CPT | Mod: CPTII,,, | Performed by: FAMILY MEDICINE

## 2023-04-25 PROCEDURE — 1160F PR REVIEW ALL MEDS BY PRESCRIBER/CLIN PHARMACIST DOCUMENTED: ICD-10-PCS | Mod: CPTII,,, | Performed by: FAMILY MEDICINE

## 2023-04-25 PROCEDURE — 99214 PR OFFICE/OUTPT VISIT, EST, LEVL IV, 30-39 MIN: ICD-10-PCS | Mod: 25,,, | Performed by: FAMILY MEDICINE

## 2023-04-25 PROCEDURE — 3078F PR MOST RECENT DIASTOLIC BLOOD PRESSURE < 80 MM HG: ICD-10-PCS | Mod: CPTII,,, | Performed by: FAMILY MEDICINE

## 2023-04-25 PROCEDURE — 1159F MED LIST DOCD IN RCRD: CPT | Mod: CPTII,,, | Performed by: FAMILY MEDICINE

## 2023-04-25 PROCEDURE — 3078F DIAST BP <80 MM HG: CPT | Mod: CPTII,,, | Performed by: FAMILY MEDICINE

## 2023-04-25 PROCEDURE — 3074F PR MOST RECENT SYSTOLIC BLOOD PRESSURE < 130 MM HG: ICD-10-PCS | Mod: CPTII,,, | Performed by: FAMILY MEDICINE

## 2023-04-25 PROCEDURE — 3008F BODY MASS INDEX DOCD: CPT | Mod: CPTII,,, | Performed by: FAMILY MEDICINE

## 2023-04-25 PROCEDURE — 3074F SYST BP LT 130 MM HG: CPT | Mod: CPTII,,, | Performed by: FAMILY MEDICINE

## 2023-04-25 RX ORDER — BUSPIRONE HYDROCHLORIDE 5 MG/1
5 TABLET ORAL 2 TIMES DAILY PRN
Qty: 60 TABLET | Refills: 2 | Status: SHIPPED | OUTPATIENT
Start: 2023-04-25 | End: 2023-09-25

## 2023-04-25 NOTE — PROGRESS NOTES
Patient ID: [id number]     Chief Complaint: Annual Exam        HPI:     Marialuisa Joseph is a 52 y.o. female here today for annual wellness exam.  Well Adult History   The patient presents for well adult exam. The patient's general health status is described as good. The patient's diet is described as balanced. Exercise: routine. Associated symptoms consist of denies weight loss, denies weight gain, denies fatigue, denies headache, denies hearing loss and denies vision changes. Last menstrual period: 2023, light, starting to skip months, brianna-menopausal, s/p BLT in . Additional pertinent history: last dental exam: 2020 (she has dental insurance, she will schedule an appointment at [facility] next available), last eye exam: > 1 year (wears eye glasses to read, she will schedule an appointment at [facility]), last pap smear: 2015 (WNL with GYN ([facility] she needs GYN referral, last MM years ago, she needs scheduled, seat belt use, occasional caffeine use (soft drinks), tobacco use none, social alcohol use and She is not fasting for labs today. She would like Hep C screening. She is UTD on Tdap. She refuses Shingrix, risks of refusal discussed with patient voicing understanding. She is UTD on all other vaccines. She denies family history of colon cancer or colon polyps, would like Cologuard ordered. She has a history of sickle cell trait and neutropenia, asymptomatic, followed by Hematology ([facility]) as scheduled, she has a sister with lupus, patient has never had auto-immune disease workup in the past.   - She reports episodes of palpitations and pre-syncope, had negative Cardiac workup with Cardiology (Dr. Yusuf) and he told her she needs Neurology evaluation, has referral in file, but they cannot see her until 2023, she needs new referral.   - She has anxiety x several months due to increased stress from possibly having to relocate. She denies  depression, SI/HI, or AH/VH. She would like trial of Rx and would like outpatient counseling.   - Patient is without any other complaints today.    Advance Care Planning     Date: 04/25/2023  Patient did not wish or was not able to name a surrogate decision maker or provide an Advance Care Plan.        ----------------------------  Arthritis  Syncope and collapse     Past Surgical History:   Procedure Laterality Date    LUMPECTOMY, BREAST Bilateral        Review of patient's allergies indicates:   Allergen Reactions    Toradol [ketorolac] Other (See Comments)     Hypotension reported       No outpatient medications have been marked as taking for the 4/25/23 encounter (Office Visit) with Lisbeth Ocampo MD.       Social History     Socioeconomic History    Marital status:    Occupational History    Occupation: Researcher   Tobacco Use    Smoking status: Never    Smokeless tobacco: Never   Substance and Sexual Activity    Alcohol use: Yes     Comment: social    Drug use: Never    Sexual activity: Yes     Social Determinants of Health     Financial Resource Strain: Low Risk     Difficulty of Paying Living Expenses: Not hard at all   Food Insecurity: No Food Insecurity    Worried About Running Out of Food in the Last Year: Never true    Ran Out of Food in the Last Year: Never true   Transportation Needs: No Transportation Needs    Lack of Transportation (Medical): No    Lack of Transportation (Non-Medical): No   Physical Activity: Sufficiently Active    Days of Exercise per Week: 7 days    Minutes of Exercise per Session: 60 min   Stress: No Stress Concern Present    Feeling of Stress : Not at all   Social Connections: Moderately Integrated    Frequency of Communication with Friends and Family: More than three times a week    Frequency of Social Gatherings with Friends and Family: More than three times a week    Attends [employer] Services: 1 to 4 times per year    Attends Club or Organization Meetings:  "Never    Marital Status:    Housing Stability: Low Risk     Unable to Pay for Housing in the Last Year: No    Number of Places Lived in the Last Year: 1    Unstable Housing in the Last Year: No        Family History   Problem Relation Age of Onset    Heart disease Maternal Grandmother         Subjective:       Review of Systems:    See HPI for details    Constitutional: No fever, No chills, No fatigue.   Eye: No blurring, No visual disturbances.   Ear/Nose/Mouth/Throat: No decreased hearing, No ear pain, No nasal congestion, No sore throat.   Respiratory: No shortness of breath, No cough, No wheezing.   Cardiovascular: No chest pain, Palpitations, No peripheral edema.   Breast: Both breasts, No lump/ mass, No pain.   Nipple discharge: None.   Gastrointestinal: No nausea, No vomiting, No diarrhea, No constipation, No abdominal pain.   Genitourinary: No dysuria, No hematuria.   Gynecologic: Negative except as documented in history of present illness.   Hematology/Lymphatics: No bruising tendency, No bleeding tendency, No swollen lymph glands.   Endocrine: No excessive thirst, No polyuria, No excessive hunger.   Musculoskeletal: No joint pain, No muscle pain, No decreased range of motion.   Integumentary: No rash, No pruritus.   Neurologic: As per HPI. No abnormal balance, No confusion, No headache.   Psychiatric: Anxiety, No depression, Not suicidal, No hallucinations.     All Other ROS: Negative except as stated in HPI.       Objective:     /79 (BP Location: Right arm, Patient Position: Sitting, BP Method: Medium (Automatic))   Pulse 64   Resp 18   Ht 5' 9" (1.753 m)   Wt 75.5 kg (166 lb 8 oz)   SpO2 99%   BMI 24.59 kg/m²     Physical Exam    General: Alert and oriented, No acute distress.   Eye: Pupils are equal, round and reactive to light, Extraocular movements are intact, Normal conjunctiva.   HENT: Normocephalic, Tympanic membranes are clear, Normal hearing, Oral mucosa is moist, No pharyngeal " erythema.   Throat: Pharynx ( Not edematous, No exudate ).   Neck: Supple, Non-tender, No carotid bruit, No lymphadenopathy, No thyromegaly.   Respiratory: Lungs are clear to auscultation, Respirations are non-labored, Breath sounds are equal, Symmetrical chest wall expansion, No chest wall tenderness.   Cardiovascular: Normal rate, Regular rhythm, No murmur, Good pulses equal in all extremities, No edema.   Gastrointestinal: Soft, Non-tender, Non-distended, Normal bowel sounds, No organomegaly.   Genitourinary: No costovertebral angle tenderness.   Musculoskeletal: Normal range of motion, No tenderness, Normal gait.   Neurologic: No focal deficits, Cranial Nerves II-XII are grossly intact.   Psychiatric: Cooperative, Appropriate mood & affect, Normal judgment, Non-suicidal.   Mood and affect: Calm.   Behavior: Relaxed.         Assessment:       ICD-10-CM ICD-9-CM   1. Wellness examination  Z00.00 V70.0   2. Visit for screening mammogram  Z12.31 V76.12   3. Screening for colon cancer  Z12.11 V76.51   4. Neutropenia, unspecified type  D70.9 288.00   5. Sickle cell trait  D57.3 282.5   6. Generalized anxiety disorder  F41.1 300.02   7. Pre-syncope  R55 780.2        Plan:     Problem List Items Addressed This Visit    None  Visit Diagnoses       Wellness examination    -  Primary    Relevant Orders    Ambulatory referral/consult to Obstetrics / Gynecology    Hepatitis C Antibody    CBC Auto Differential    Comprehensive Metabolic Panel    Hemoglobin A1C    Lipid Panel    TSH    Urinalysis, Reflex to Urine Culture    Visit for screening mammogram        Relevant Orders    Mammo Digital Screening Bilat w/ Nasir    Screening for colon cancer        Relevant Orders    Cologuard Screening (Multitarget Stool DNA)    Neutropenia, unspecified type        Relevant Orders    ANTINUCLEAR ANTIBODIES COMPREHENSIVE PANEL    CYCLIC CITRULLINATED PEPTIDE (CCP) ANTIBODY    C-Reactive Protein    Sickle cell trait        Relevant Orders     Iron and TIBC    Generalized anxiety disorder        Relevant Medications    busPIRone (BUSPAR) 5 MG Tab    Other Relevant Orders    Ambulatory referral/consult to Psychology    Pre-syncope        Relevant Orders    Ambulatory referral/consult to Neurology         1. Wellness examination  - Ambulatory referral/consult to Obstetrics / Gynecology; Future  - Hepatitis C Antibody; Future  - CBC Auto Differential; Future  - Comprehensive Metabolic Panel; Future  - Hemoglobin A1C; Future  - Lipid Panel; Future  - TSH; Future  - Urinalysis, Reflex to Urine Culture; Future  Will treat pending lab results. Monthly breast self exam encouraged. Diet, exercise, and 10% weight loss encouraged. Keep appointment for dental exams x q6 months as scheduled. Keep appointment for annual eye exam as scheduled. Keep appointment with specialists as scheduled. Notify M.D. or ER if temp greater than 100.4, or any acute illness.      2. Visit for screening mammogram  - Mammo Digital Screening Bilat w/ Nasir; Future    3. Screening for colon cancer  - Cologuard Screening (Multitarget Stool DNA); Future  - Cologuard Screening (Multitarget Stool DNA)    4. Neutropenia, unspecified type  - ANTINUCLEAR ANTIBODIES COMPREHENSIVE PANEL; Future  - CYCLIC CITRULLINATED PEPTIDE (CCP) ANTIBODY; Future  - C-Reactive Protein; Future  - Asymptomatic, continue followup with Hematology as scheduled. Will treat pending results. Notify M.D. or ER if symptoms persist or worsen, temp >100.4, or any acute illness.      5. Sickle cell trait  - Iron and TIBC; Future  - Asymptomatic, continue followup with Hematology as scheduled. Will treat pending results. Notify M.D. or ER if symptoms persist or worsen, temp >100.4, or any acute illness.      6. Generalized anxiety disorder  - Rx trial of busPIRone (BUSPAR) 5 MG Tab; Take 1 tablet (5 mg total) by mouth 2 (two) times daily as needed (anxiety).  Dispense: 60 tablet; Refill: 2  - Ambulatory referral/consult to  Psychology; Future for outpatient counseling.  - Continue relaxation techniques. Will titrate medication as needed/tolerated. Notify M.D. or ER if symptoms persist or worsen, SI/HI, temp greater than 100.4, or any acute illness.      7. Pre-syncope  - Ambulatory referral/consult to Neurology; Future for evaluation and treatment.  - Asymptomatic, continue current treatment plan and fall precautions.         Marialuisa was seen today for annual exam.    Diagnoses and all orders for this visit:    Wellness examination  -     Ambulatory referral/consult to Obstetrics / Gynecology; Future  -     Hepatitis C Antibody; Future  -     CBC Auto Differential; Future  -     Comprehensive Metabolic Panel; Future  -     Hemoglobin A1C; Future  -     Lipid Panel; Future  -     TSH; Future  -     Urinalysis, Reflex to Urine Culture; Future    Visit for screening mammogram  -     Mammo Digital Screening Bilat w/ Nasir; Future    Screening for colon cancer  -     Cologuard Screening (Multitarget Stool DNA); Future  -     Cologuard Screening (Multitarget Stool DNA)    Neutropenia, unspecified type  -     ANTINUCLEAR ANTIBODIES COMPREHENSIVE PANEL; Future  -     CYCLIC CITRULLINATED PEPTIDE (CCP) ANTIBODY; Future  -     C-Reactive Protein; Future    Sickle cell trait  -     Iron and TIBC; Future    Generalized anxiety disorder  -     busPIRone (BUSPAR) 5 MG Tab; Take 1 tablet (5 mg total) by mouth 2 (two) times daily as needed (anxiety).  -     Ambulatory referral/consult to Psychology; Future    Pre-syncope  -     Ambulatory referral/consult to Neurology; Future                 Follow up in about 3 months (around 7/25/2023) for Anxiety followup.

## 2023-04-26 ENCOUNTER — TELEPHONE (OUTPATIENT)
Dept: PRIMARY CARE CLINIC | Facility: CLINIC | Age: 53
End: 2023-04-26
Payer: COMMERCIAL

## 2023-04-26 NOTE — TELEPHONE ENCOUNTER
----- Message from Lisbeth Ocampo MD sent at 4/26/2023  8:29 AM CDT -----  She has white blood cells, red blood cells, and bacteria in her urine, this can be suggestive of urinary tract infection, will await results of pending urine culture for further recommendations.

## 2023-04-27 ENCOUNTER — PATIENT MESSAGE (OUTPATIENT)
Dept: FAMILY MEDICINE | Facility: CLINIC | Age: 53
End: 2023-04-27
Payer: COMMERCIAL

## 2023-05-01 ENCOUNTER — PATIENT MESSAGE (OUTPATIENT)
Dept: ADMINISTRATIVE | Facility: HOSPITAL | Age: 53
End: 2023-05-01
Payer: COMMERCIAL

## 2023-05-05 ENCOUNTER — LAB VISIT (OUTPATIENT)
Dept: LAB | Facility: HOSPITAL | Age: 53
End: 2023-05-05
Attending: FAMILY MEDICINE
Payer: COMMERCIAL

## 2023-05-05 ENCOUNTER — TELEPHONE (OUTPATIENT)
Dept: FAMILY MEDICINE | Facility: CLINIC | Age: 53
End: 2023-05-05
Payer: COMMERCIAL

## 2023-05-05 DIAGNOSIS — D70.9 NEUTROPENIA, UNSPECIFIED TYPE: ICD-10-CM

## 2023-05-05 DIAGNOSIS — D57.3 SICKLE CELL TRAIT: ICD-10-CM

## 2023-05-05 DIAGNOSIS — R74.01 ELEVATED AST (SGOT): Primary | ICD-10-CM

## 2023-05-05 DIAGNOSIS — Z00.00 WELLNESS EXAMINATION: ICD-10-CM

## 2023-05-05 LAB
ABS NEUT (OLG): 0.74 X10(3)/MCL (ref 2.1–9.2)
ALBUMIN SERPL-MCNC: 3.6 G/DL (ref 3.5–5)
ALBUMIN/GLOB SERPL: 1.2 RATIO (ref 1.1–2)
ALP SERPL-CCNC: 128 UNIT/L (ref 40–150)
ALT SERPL-CCNC: 48 UNIT/L (ref 0–55)
AST SERPL-CCNC: 45 UNIT/L (ref 5–34)
BASOPHILS NFR BLD MANUAL: 0.02 X10(3)/MCL (ref 0–0.2)
BASOPHILS NFR BLD MANUAL: 1 %
BILIRUBIN DIRECT+TOT PNL SERPL-MCNC: 1.1 MG/DL
BUN SERPL-MCNC: 13 MG/DL (ref 9.8–20.1)
CALCIUM SERPL-MCNC: 9.5 MG/DL (ref 8.4–10.2)
CHLORIDE SERPL-SCNC: 110 MMOL/L (ref 98–107)
CHOLEST SERPL-MCNC: 168 MG/DL
CHOLEST/HDLC SERPL: 3 {RATIO} (ref 0–5)
CO2 SERPL-SCNC: 26 MMOL/L (ref 22–29)
CREAT SERPL-MCNC: 1.1 MG/DL (ref 0.55–1.02)
CRP SERPL-MCNC: 1.3 MG/L
EOSINOPHIL NFR BLD MANUAL: 0.02 X10(3)/MCL (ref 0–0.9)
EOSINOPHIL NFR BLD MANUAL: 1 %
ERYTHROCYTE [DISTWIDTH] IN BLOOD BY AUTOMATED COUNT: 13.2 % (ref 11.5–17)
EST. AVERAGE GLUCOSE BLD GHB EST-MCNC: 91.1 MG/DL
GFR SERPLBLD CREATININE-BSD FMLA CKD-EPI: >60 MLS/MIN/1.73/M2
GLOBULIN SER-MCNC: 3 GM/DL (ref 2.4–3.5)
GLUCOSE SERPL-MCNC: 88 MG/DL (ref 74–100)
HBA1C MFR BLD: 4.8 %
HCT VFR BLD AUTO: 41.9 % (ref 37–47)
HCV AB SERPL QL IA: NONREACTIVE
HDLC SERPL-MCNC: 65 MG/DL (ref 35–60)
HGB BLD-MCNC: 13.9 G/DL (ref 12–16)
INSTRUMENT WBC (OLG): 1.85 X10(3)/MCL
IRON SATN MFR SERPL: 41 % (ref 20–50)
IRON SERPL-MCNC: 111 UG/DL (ref 50–170)
LDLC SERPL CALC-MCNC: 95 MG/DL (ref 50–140)
LYMPHOCYTES NFR BLD MANUAL: 0.93 X10(3)/MCL
LYMPHOCYTES NFR BLD MANUAL: 50 %
MCH RBC QN AUTO: 29.3 PG (ref 27–31)
MCHC RBC AUTO-ENTMCNC: 33.2 G/DL (ref 33–36)
MCV RBC AUTO: 88.4 FL (ref 80–94)
MONOCYTES NFR BLD MANUAL: 0.17 X10(3)/MCL (ref 0.1–1.3)
MONOCYTES NFR BLD MANUAL: 9 %
NEUTROPHILS NFR BLD MANUAL: 40 %
NRBC BLD AUTO-RTO: 0 %
PLATELET # BLD AUTO: 230 X10(3)/MCL (ref 130–400)
PLATELET # BLD EST: NORMAL 10*3/UL
PMV BLD AUTO: 11.4 FL (ref 7.4–10.4)
POTASSIUM SERPL-SCNC: 4.3 MMOL/L (ref 3.5–5.1)
PROT SERPL-MCNC: 6.6 GM/DL (ref 6.4–8.3)
RBC # BLD AUTO: 4.74 X10(6)/MCL (ref 4.2–5.4)
RBC MORPH BLD: NORMAL
SODIUM SERPL-SCNC: 140 MMOL/L (ref 136–145)
TIBC SERPL-MCNC: 163 UG/DL (ref 70–310)
TIBC SERPL-MCNC: 274 UG/DL (ref 250–450)
TRANSFERRIN SERPL-MCNC: 228 MG/DL (ref 180–382)
TRIGL SERPL-MCNC: 40 MG/DL (ref 37–140)
TSH SERPL-ACNC: 0.95 UIU/ML (ref 0.35–4.94)
VLDLC SERPL CALC-MCNC: 8 MG/DL
WBC # SPEC AUTO: 1.85 X10(3)/MCL (ref 4.5–11.5)

## 2023-05-05 PROCEDURE — 84443 ASSAY THYROID STIM HORMONE: CPT

## 2023-05-05 PROCEDURE — 86803 HEPATITIS C AB TEST: CPT

## 2023-05-05 PROCEDURE — 86225 DNA ANTIBODY NATIVE: CPT

## 2023-05-05 PROCEDURE — 86235 NUCLEAR ANTIGEN ANTIBODY: CPT

## 2023-05-05 PROCEDURE — 86140 C-REACTIVE PROTEIN: CPT

## 2023-05-05 PROCEDURE — 85025 COMPLETE CBC W/AUTO DIFF WBC: CPT

## 2023-05-05 PROCEDURE — 83550 IRON BINDING TEST: CPT

## 2023-05-05 PROCEDURE — 80053 COMPREHEN METABOLIC PANEL: CPT

## 2023-05-05 PROCEDURE — 85027 COMPLETE CBC AUTOMATED: CPT

## 2023-05-05 PROCEDURE — 86003 ALLG SPEC IGE CRUDE XTRC EA: CPT

## 2023-05-05 PROCEDURE — 36415 COLL VENOUS BLD VENIPUNCTURE: CPT

## 2023-05-05 PROCEDURE — 83036 HEMOGLOBIN GLYCOSYLATED A1C: CPT

## 2023-05-05 PROCEDURE — 80061 LIPID PANEL: CPT

## 2023-05-08 ENCOUNTER — PATIENT MESSAGE (OUTPATIENT)
Dept: FAMILY MEDICINE | Facility: CLINIC | Age: 53
End: 2023-05-08
Payer: COMMERCIAL

## 2023-05-09 ENCOUNTER — PATIENT MESSAGE (OUTPATIENT)
Dept: FAMILY MEDICINE | Facility: CLINIC | Age: 53
End: 2023-05-09
Payer: COMMERCIAL

## 2023-05-09 LAB
ANTINUCLEAR ANTIBODY SCREEN (OHS): NEGATIVE
CENTROMERE QUANT (OHS): <0.4 U/ML
CYCLIC CITRULLINATED PEPTIDE (CCP) (OHS): 1.2 U/ML
DSDNA AB QUANT (OHS): <0.6 IU/ML
JO-1 AB QUANT (OHS): <0.3 U/ML
RNP70 AB QUANT (OHS): 4.6 U/ML
SCL-70S AB QUANT (OHS): <0.6 U/ML
SMITH AB QUANT (OHS): 1.1 U/ML
SSA(RO) AB QUANT (OHS): <0.4 U/ML
SSB(LA) AB QUANT (OHS): <0.4 U/ML
U1RNP AB QUANT (OHS): 2.2 U/ML

## 2023-05-17 ENCOUNTER — TELEPHONE (OUTPATIENT)
Dept: FAMILY MEDICINE | Facility: CLINIC | Age: 53
End: 2023-05-17
Payer: COMMERCIAL

## 2023-05-23 ENCOUNTER — TELEPHONE (OUTPATIENT)
Dept: FAMILY MEDICINE | Facility: CLINIC | Age: 53
End: 2023-05-23
Payer: COMMERCIAL

## 2023-05-23 DIAGNOSIS — R00.2 PALPITATIONS: Primary | ICD-10-CM

## 2023-05-23 NOTE — TELEPHONE ENCOUNTER
----- Message from Emery Mamadou sent at 5/23/2023 11:46 AM CDT -----  .Type:  Needs Medical Advice    Who Called: pt  Symptoms (please be specific): erratic heart beat at night   How long has patient had these symptoms:   Pharmacy name and phone #:    Would the patient rather a call back or a response via [facility]? Call back  Best Call Back Number: [phone]  Additional Information: pt is calling to update Dr. Ocampo and is inquiring about another hormone blood workup

## 2023-05-23 NOTE — TELEPHONE ENCOUNTER
Pt voiced she had another episode a week ago when her heart started racing. She was sleeping and woke up suddenly with her heart racing at 123. Felt dizzy and faintish. It reduced after 10-15 minutes.   She would like to do blood work. Please advise what's next thanks!

## 2023-05-24 ENCOUNTER — PATIENT MESSAGE (OUTPATIENT)
Dept: FAMILY MEDICINE | Facility: CLINIC | Age: 53
End: 2023-05-24
Payer: COMMERCIAL

## 2023-05-24 LAB — NONINV COLON CA DNA+OCC BLD SCRN STL QL: NEGATIVE

## 2023-05-24 NOTE — TELEPHONE ENCOUNTER
Pt voiced she did not call her cardiologist but her referral for neurology was scheduled and her EEG came back normal. I did voiced to pt to call her cardiologist. Voiced thanks and understanding.

## 2023-05-26 ENCOUNTER — LAB VISIT (OUTPATIENT)
Dept: LAB | Facility: HOSPITAL | Age: 53
End: 2023-05-26
Attending: FAMILY MEDICINE
Payer: COMMERCIAL

## 2023-05-26 DIAGNOSIS — R00.2 PALPITATIONS: ICD-10-CM

## 2023-05-26 LAB
ALBUMIN SERPL-MCNC: 3.9 G/DL (ref 3.5–5)
ALBUMIN/GLOB SERPL: 1.3 RATIO (ref 1.1–2)
ALP SERPL-CCNC: 113 UNIT/L (ref 40–150)
ALT SERPL-CCNC: 29 UNIT/L (ref 0–55)
AST SERPL-CCNC: 30 UNIT/L (ref 5–34)
BASOPHILS # BLD AUTO: 0.03 X10(3)/MCL
BASOPHILS NFR BLD AUTO: 1.4 %
BILIRUBIN DIRECT+TOT PNL SERPL-MCNC: 0.8 MG/DL
BUN SERPL-MCNC: 14.4 MG/DL (ref 9.8–20.1)
CALCIUM SERPL-MCNC: 9.7 MG/DL (ref 8.4–10.2)
CHLORIDE SERPL-SCNC: 106 MMOL/L (ref 98–107)
CO2 SERPL-SCNC: 25 MMOL/L (ref 22–29)
CREAT SERPL-MCNC: 1 MG/DL (ref 0.55–1.02)
EOSINOPHIL # BLD AUTO: 0.05 X10(3)/MCL (ref 0–0.9)
EOSINOPHIL NFR BLD AUTO: 2.4 %
ERYTHROCYTE [DISTWIDTH] IN BLOOD BY AUTOMATED COUNT: 13.2 % (ref 11.5–17)
FSH SERPL-ACNC: 74.13 MIU/ML
GFR SERPLBLD CREATININE-BSD FMLA CKD-EPI: >60 MLS/MIN/1.73/M2
GLOBULIN SER-MCNC: 3 GM/DL (ref 2.4–3.5)
GLUCOSE SERPL-MCNC: 86 MG/DL (ref 74–100)
HCT VFR BLD AUTO: 43.4 % (ref 37–47)
HGB BLD-MCNC: 14.4 G/DL (ref 12–16)
IMM GRANULOCYTES # BLD AUTO: 0.01 X10(3)/MCL (ref 0–0.04)
IMM GRANULOCYTES NFR BLD AUTO: 0.5 %
LH SERPL-ACNC: 21.68 MIU/ML
LYMPHOCYTES # BLD AUTO: 0.73 X10(3)/MCL (ref 0.6–4.6)
LYMPHOCYTES NFR BLD AUTO: 34.8 %
MCH RBC QN AUTO: 29.1 PG (ref 27–31)
MCHC RBC AUTO-ENTMCNC: 33.2 G/DL (ref 33–36)
MCV RBC AUTO: 87.7 FL (ref 80–94)
MONOCYTES # BLD AUTO: 0.22 X10(3)/MCL (ref 0.1–1.3)
MONOCYTES NFR BLD AUTO: 10.5 %
NEUTROPHILS # BLD AUTO: 1.06 X10(3)/MCL (ref 2.1–9.2)
NEUTROPHILS NFR BLD AUTO: 50.4 %
NRBC BLD AUTO-RTO: 0 %
PLATELET # BLD AUTO: 219 X10(3)/MCL (ref 130–400)
PMV BLD AUTO: 11.4 FL (ref 7.4–10.4)
POTASSIUM SERPL-SCNC: 4.3 MMOL/L (ref 3.5–5.1)
PROT SERPL-MCNC: 6.9 GM/DL (ref 6.4–8.3)
RBC # BLD AUTO: 4.95 X10(6)/MCL (ref 4.2–5.4)
SODIUM SERPL-SCNC: 139 MMOL/L (ref 136–145)
TESTOST SERPL-MCNC: 37.6 NG/DL (ref 12.4–35.75)
TSH SERPL-ACNC: 1.3 UIU/ML (ref 0.35–4.94)
WBC # SPEC AUTO: 2.1 X10(3)/MCL (ref 4.5–11.5)

## 2023-05-26 PROCEDURE — 84443 ASSAY THYROID STIM HORMONE: CPT

## 2023-05-26 PROCEDURE — 85025 COMPLETE CBC W/AUTO DIFF WBC: CPT

## 2023-05-26 PROCEDURE — 36415 COLL VENOUS BLD VENIPUNCTURE: CPT

## 2023-05-26 PROCEDURE — 83002 ASSAY OF GONADOTROPIN (LH): CPT

## 2023-05-26 PROCEDURE — 82627 DEHYDROEPIANDROSTERONE: CPT | Mod: 90

## 2023-05-26 PROCEDURE — 83001 ASSAY OF GONADOTROPIN (FSH): CPT

## 2023-05-26 PROCEDURE — 80053 COMPREHEN METABOLIC PANEL: CPT

## 2023-05-26 PROCEDURE — 83498 ASY HYDROXYPROGESTERONE 17-D: CPT

## 2023-05-26 PROCEDURE — 82671 ASSAY OF ESTROGENS: CPT

## 2023-05-26 PROCEDURE — 84403 ASSAY OF TOTAL TESTOSTERONE: CPT

## 2023-05-27 LAB — DHEA-S SERPL-MCNC: 139 MCG/DL (ref 16–195)

## 2023-05-30 ENCOUNTER — HOSPITAL ENCOUNTER (OUTPATIENT)
Dept: RADIOLOGY | Facility: HOSPITAL | Age: 53
Discharge: HOME | End: 2023-05-30
Attending: FAMILY MEDICINE
Payer: COMMERCIAL

## 2023-05-30 DIAGNOSIS — R92.8 ABNORMAL MAMMOGRAM: ICD-10-CM

## 2023-05-30 LAB
ESTRADIOL SERPL-MCNC: <10 PG/ML
ESTRONE SERPL-MCNC: 35 PG/ML

## 2023-05-30 PROCEDURE — 76642 ULTRASOUND BREAST LIMITED: CPT | Mod: TC,LT

## 2023-05-30 PROCEDURE — 77066 MAMMO DIGITAL DIAGNOSTIC BILAT WITH TOMO: ICD-10-PCS | Mod: 26,,, | Performed by: RADIOLOGY

## 2023-05-30 PROCEDURE — 76642 US BREAST LEFT LIMITED: ICD-10-PCS | Mod: 26,LT,, | Performed by: RADIOLOGY

## 2023-05-30 PROCEDURE — 77062 BREAST TOMOSYNTHESIS BI: CPT | Mod: 26,,, | Performed by: RADIOLOGY

## 2023-05-30 PROCEDURE — 77066 DX MAMMO INCL CAD BI: CPT | Mod: 26,,, | Performed by: RADIOLOGY

## 2023-05-30 PROCEDURE — 77062 BREAST TOMOSYNTHESIS BI: CPT | Mod: TC

## 2023-05-30 PROCEDURE — 77062 MAMMO DIGITAL DIAGNOSTIC BILAT WITH TOMO: ICD-10-PCS | Mod: 26,,, | Performed by: RADIOLOGY

## 2023-05-30 PROCEDURE — 76642 ULTRASOUND BREAST LIMITED: CPT | Mod: 26,LT,, | Performed by: RADIOLOGY

## 2023-05-31 ENCOUNTER — TELEPHONE (OUTPATIENT)
Dept: FAMILY MEDICINE | Facility: CLINIC | Age: 53
End: 2023-05-31
Payer: COMMERCIAL

## 2023-05-31 ENCOUNTER — PATIENT MESSAGE (OUTPATIENT)
Dept: FAMILY MEDICINE | Facility: CLINIC | Age: 53
End: 2023-05-31
Payer: COMMERCIAL

## 2023-05-31 NOTE — TELEPHONE ENCOUNTER
----- Message from Lisbeth Ocampo MD sent at 5/26/2023 12:02 PM CDT -----  Patient has known low WBC count, mildly improved, followed by Hematology at [facility], please fax results to Hematologist for review, evaluation and treatment. Testosterone level and FSH female hormone testing are elevated and can be suggestive of menopause, patient has GYN referral in file, please fax results to her GYN for review/treatment. Progesterone, estrogen, and DHEA-S levels are still pending. Remaining labs are essentially normal.

## 2023-06-01 ENCOUNTER — TELEPHONE (OUTPATIENT)
Dept: FAMILY MEDICINE | Facility: CLINIC | Age: 53
End: 2023-06-01
Payer: COMMERCIAL

## 2023-06-01 NOTE — TELEPHONE ENCOUNTER
----- Message from Marko Griffin sent at 6/1/2023  9:47 AM CDT -----  .Type:  Needs Medical Advice    Who Called: [facility]  Symptoms (please be specific):    How long has patient had these symptoms:    Pharmacy name and phone #:    Would the patient rather a call back or a response via [facility]?   Best Call Back Number: [phone]  Additional Information: She was calling needs the nurse to call her back as they received something about a possible referral.

## 2023-06-01 NOTE — TELEPHONE ENCOUNTER
Returned Ms. Ramsey's call to get further clarification on what's going on.    Ms. Ramsey reported that they received the patient labs, however, said the patient wasn't seen there. Wasn't sure if she was a referral or not. I told Ms. Ramsey that she did have a past referral with Josh Booth MD and had seen her. Roxy reported that Dr. Booth had moved to [place], thus, her fax is different.     She gave me the number to her office and I called their office to get a fax number to fax over the labs. Labs will be faxed over to new location today.

## 2023-06-02 ENCOUNTER — TELEPHONE (OUTPATIENT)
Dept: FAMILY MEDICINE | Facility: CLINIC | Age: 53
End: 2023-06-02
Payer: COMMERCIAL

## 2023-06-02 DIAGNOSIS — U07.1 COVID-19: Primary | ICD-10-CM

## 2023-06-02 RX ORDER — NIRMATRELVIR AND RITONAVIR 300-100 MG
KIT ORAL
Qty: 30 TABLET | Refills: 0 | Status: SHIPPED | OUTPATIENT
Start: 2023-06-02 | End: 2023-06-07

## 2023-06-02 NOTE — TELEPHONE ENCOUNTER
----- Message from Emery Cedillo sent at 6/2/2023  9:56 AM CDT -----  .Type:  Needs Medical Advice    Who Called: pt  Symptoms (please be specific): covid + at home test  How long has patient had these symptoms:  yesterday  Pharmacy name and phone #:    Would the patient rather a call back or a response via [facility]? Call back  Best Call Back Number: [phone]  Additional Information:

## 2023-06-05 LAB — MAYO GENERIC ORDERABLE RESULT: NORMAL

## 2023-06-08 ENCOUNTER — TELEPHONE (OUTPATIENT)
Dept: FAMILY MEDICINE | Facility: CLINIC | Age: 53
End: 2023-06-08

## 2023-06-08 NOTE — TELEPHONE ENCOUNTER
----- Message from Emery Cedillo sent at 6/8/2023 10:05 AM CDT -----  .Type:  Needs Medical Advice    Who Called: Dr. Josh Booth's Office  Would the patient rather a call back or a response via [facility]? Call back  Best Call Back Number: [phone]  Additional Information: calling to speak about a fax that was sent of lab work but no referral was attached attempted back line no answer

## 2023-06-29 ENCOUNTER — TELEPHONE (OUTPATIENT)
Dept: FAMILY MEDICINE | Facility: CLINIC | Age: 53
End: 2023-06-29
Payer: COMMERCIAL

## 2023-06-29 ENCOUNTER — PATIENT MESSAGE (OUTPATIENT)
Dept: FAMILY MEDICINE | Facility: CLINIC | Age: 53
End: 2023-06-29
Payer: COMMERCIAL

## 2023-06-29 NOTE — TELEPHONE ENCOUNTER
----- Message from Marko Griffin sent at 6/29/2023 10:25 AM CDT -----  .Type:  Needs Medical Advice    Who Called: Marialuisa  Symptoms (please be specific):    How long has patient had these symptoms:    Pharmacy name and phone #:    Would the patient rather a call back or a response via [facility]?   Best Call Back Number: [phone]  Additional Information: She requested a call back from the nurse re: a referral she is suppose to have form Dr. Ocampo for a OBGYN.

## 2023-06-29 NOTE — TELEPHONE ENCOUNTER
Josh collier [facility] - [phone]      moved in feb. 2023 to [place] .   Contacted dr office they have referral and they tried contacting pt. Sending number to pt thru portal she is driving .

## 2023-07-26 ENCOUNTER — OFFICE VISIT (OUTPATIENT)
Dept: FAMILY MEDICINE | Facility: CLINIC | Age: 53
End: 2023-07-26
Payer: COMMERCIAL

## 2023-07-26 VITALS
HEIGHT: 69 IN | DIASTOLIC BLOOD PRESSURE: 73 MMHG | SYSTOLIC BLOOD PRESSURE: 123 MMHG | HEART RATE: 52 BPM | BODY MASS INDEX: 25.48 KG/M2 | WEIGHT: 172 LBS

## 2023-07-26 DIAGNOSIS — D70.9 NEUTROPENIA, UNSPECIFIED TYPE: ICD-10-CM

## 2023-07-26 DIAGNOSIS — Z12.4 CERVICAL CANCER SCREENING: ICD-10-CM

## 2023-07-26 DIAGNOSIS — F41.1 GENERALIZED ANXIETY DISORDER: Primary | ICD-10-CM

## 2023-07-26 PROCEDURE — 1160F PR REVIEW ALL MEDS BY PRESCRIBER/CLIN PHARMACIST DOCUMENTED: ICD-10-PCS | Mod: CPTII,,, | Performed by: FAMILY MEDICINE

## 2023-07-26 PROCEDURE — 3078F PR MOST RECENT DIASTOLIC BLOOD PRESSURE < 80 MM HG: ICD-10-PCS | Mod: CPTII,,, | Performed by: FAMILY MEDICINE

## 2023-07-26 PROCEDURE — 3044F PR MOST RECENT HEMOGLOBIN A1C LEVEL <7.0%: ICD-10-PCS | Mod: CPTII,,, | Performed by: FAMILY MEDICINE

## 2023-07-26 PROCEDURE — 3078F DIAST BP <80 MM HG: CPT | Mod: CPTII,,, | Performed by: FAMILY MEDICINE

## 2023-07-26 PROCEDURE — 3044F HG A1C LEVEL LT 7.0%: CPT | Mod: CPTII,,, | Performed by: FAMILY MEDICINE

## 2023-07-26 PROCEDURE — 3008F PR BODY MASS INDEX (BMI) DOCUMENTED: ICD-10-PCS | Mod: CPTII,,, | Performed by: FAMILY MEDICINE

## 2023-07-26 PROCEDURE — 3074F PR MOST RECENT SYSTOLIC BLOOD PRESSURE < 130 MM HG: ICD-10-PCS | Mod: CPTII,,, | Performed by: FAMILY MEDICINE

## 2023-07-26 PROCEDURE — 1160F RVW MEDS BY RX/DR IN RCRD: CPT | Mod: CPTII,,, | Performed by: FAMILY MEDICINE

## 2023-07-26 PROCEDURE — 3008F BODY MASS INDEX DOCD: CPT | Mod: CPTII,,, | Performed by: FAMILY MEDICINE

## 2023-07-26 PROCEDURE — 99212 OFFICE O/P EST SF 10 MIN: CPT | Mod: ,,, | Performed by: FAMILY MEDICINE

## 2023-07-26 PROCEDURE — 1159F MED LIST DOCD IN RCRD: CPT | Mod: CPTII,,, | Performed by: FAMILY MEDICINE

## 2023-07-26 PROCEDURE — 3074F SYST BP LT 130 MM HG: CPT | Mod: CPTII,,, | Performed by: FAMILY MEDICINE

## 2023-07-26 PROCEDURE — 99212 PR OFFICE/OUTPT VISIT, EST, LEVL II, 10-19 MIN: ICD-10-PCS | Mod: ,,, | Performed by: FAMILY MEDICINE

## 2023-07-26 PROCEDURE — 1159F PR MEDICATION LIST DOCUMENTED IN MEDICAL RECORD: ICD-10-PCS | Mod: CPTII,,, | Performed by: FAMILY MEDICINE

## 2023-07-26 NOTE — PROGRESS NOTES
Patient ID: [id number]     Chief Complaint: Anxiety (Patient here for follow up for anxiety. )        HPI:     Marialuisa Joseph is a 52 y.o. female here today for a follow up anxiety.   - She is here for followup anxiety controlled with relaxation techniques, she has Buspirone at home, hasn't needed to take Rx, but like to have it on hand. She denies depression, SI/HI, or AH/VH. She would like referral for outpatient counseling.   - She needs GYN referral to pap smear.   - She has neutropenia, asymptomatic, was seeing Hematology (Dr. Roque), needs new referral.   - Patient is without any other complaints today.        ----------------------------  Anxiety  Arthritis  Syncope and collapse     Past Surgical History:   Procedure Laterality Date    LUMPECTOMY, BREAST Bilateral     TUBAL LIGATION         Review of patient's allergies indicates:   Allergen Reactions    Toradol [ketorolac] Other (See Comments)     Hypotension reported       No outpatient medications have been marked as taking for the 7/26/23 encounter (Office Visit) with Lisbeth Ocampo MD.       Social History     Socioeconomic History    Marital status:    Occupational History    Occupation: Researcher   Tobacco Use    Smoking status: Never    Smokeless tobacco: Never   Substance and Sexual Activity    Alcohol use: Yes     Alcohol/week: 1.0 standard drink     Types: 1 Glasses of wine per week     Comment: social    Drug use: Never    Sexual activity: Yes     Social Determinants of Health     Financial Resource Strain: Low Risk     Difficulty of Paying Living Expenses: Not hard at all   Food Insecurity: No Food Insecurity    Worried About Running Out of Food in the Last Year: Never true    Ran Out of Food in the Last Year: Never true   Transportation Needs: No Transportation Needs    Lack of Transportation (Medical): No    Lack of Transportation (Non-Medical): No   Physical Activity: Sufficiently Active    Days of Exercise per Week: 7  days    Minutes of Exercise per Session: 60 min   Stress: No Stress Concern Present    Feeling of Stress : Not at all   Social Connections: Moderately Integrated    Frequency of Communication with Friends and Family: More than three times a week    Frequency of Social Gatherings with Friends and Family: More than three times a week    Attends [employer] Services: 1 to 4 times per year    Attends Club or Organization Meetings: Never    Marital Status:    Housing Stability: Low Risk     Unable to Pay for Housing in the Last Year: No    Number of Places Lived in the Last Year: 1    Unstable Housing in the Last Year: No        Family History   Problem Relation Age of Onset    Heart disease Maternal Grandmother         Subjective:       Review of Systems:    See HPI for details    Constitutional: Denies Change in appetite. Denies Chills. Denies Fever. Denies Night sweats.  Eye: Denies Blurred vision. Denies Discharge. Denies Eye pain.  ENT: Denies Decreased hearing. Denies Sore throat. Denies Swollen glands.  Respiratory: Denies Cough. Denies Shortness of breath. Denies Shortness of breath with exertion. Denies Wheezing.  Cardiovascular: Denies Chest pain at rest. Denies Chest pain with exertion. Denies Irregular heartbeat. Denies Palpitations.  Gastrointestinal: Denies Abdominal pain. Denies Diarrhea. Denies Nausea. Denies Vomiting. Denies Hematemesis or Hematochezia.  Genitourinary: Denies Dysuria. Denies Urinary frequency. Denies Urinary urgency. Denies Blood in urine.  Endocrine: Denies Cold intolerance. Denies Excessive thirst. Denies Heat intolerance. Denies Weight loss. Denies Weight gain.  Musculoskeletal: Denies Painful joints. Denies Weakness.  Integumentary: Denies Rash. Denies Itching. Denies Dry skin.  Neurologic: Denies Dizziness. Denies Fainting. Denies Headache.  Psychiatric: Denies Depression. Reports Anxiety. Denies Suicidal/Homicidal ideations.    All Other ROS: Negative except as stated in HPI.  "      Objective:     /73 (BP Location: Right arm, Patient Position: Sitting, BP Method: Medium (Automatic))   Pulse (!) 52   Ht 5' 9" (1.753 m)   Wt 78 kg (172 lb)   BMI 25.40 kg/m²     Physical Exam    General: Alert and oriented, No acute distress.  Head: Normocephalic, Atraumatic.  Eye: Pupils are equal, round and reactive to light, Extraocular movements are intact, Sclera non-icteric.  Ears/Nose/Throat: Normal, Mucosa moist,Clear.  Neck/Thyroid: Supple, Non-tender, No carotid bruit, No palpable thyromegaly or thyroid nodule, No lymphadenopathy, No JVD, Full range of motion.  Respiratory: Clear to auscultation bilaterally; No wheezes, rales or rhonchi,Non-labored respirations, Symmetrical chest wall expansion.  Cardiovascular: Regular rate and rhythm, S1/S2 normal, No murmurs, rubs or gallops.  Gastrointestinal: Soft, Non-tender, Non-distended, Normal bowel sounds, No palpable organomegaly.  Musculoskeletal: Normal range of motion.  Integumentary: Warm, Dry, Intact, No suspicious lesions or rashes.  Extremities: No clubbing, cyanosis or edema  Neurologic: No focal deficits, Cranial Nerves II-XII are grossly intact, Motor strength normal upper and lower extremities, Sensory exam intact.  Psychiatric: Normal interaction, Coherent speech, Euthymic mood, Appropriate affect         Assessment:       ICD-10-CM ICD-9-CM   1. Generalized anxiety disorder  F41.1 300.02   2. Cervical cancer screening  Z12.4 V76.2   3. Neutropenia, unspecified type  D70.9 288.00        Plan:     Problem List Items Addressed This Visit    None  Visit Diagnoses       Generalized anxiety disorder    -  Primary    Relevant Orders    Ambulatory referral/consult to Psychology    Cervical cancer screening        Relevant Orders    Ambulatory referral/consult to Obstetrics / Gynecology    Neutropenia, unspecified type        Relevant Orders    Ambulatory referral/consult to Hematology / Oncology         1. Generalized anxiety disorder  - " Ambulatory referral/consult to Psychology; Future for outpatient counseling.  - Buspirone p.r.n. Continue relaxation techniques. Will titrate medication as needed/tolerated. Notify M.D. or ER if symptoms persist or worsen, SI/HI, temp greater than 100.4, or any acute illness.    Start   /  Continue   Practice deep breathing or abdominal breathing exercises when anxiety occurs.  Exercise daily. Get sunlight daily.  Avoid caffeine, alcohol and stimulants.  Practice positive phrases and repeat throughout the day, yoga, lavender scents or Chamomile tea will help anxiety.  Set healthy boundaries, avoid people and conversations that increase stress.  Reports any symptoms of suicidal or homicidal ideations immediately, if clinic is closed go to nearest emergency room.     2. Cervical cancer screening  - Ambulatory referral/consult to Obstetrics / Gynecology; Future for annual with pap smear.     3. Neutropenia, unspecified type  - Ambulatory referral/consult to Hematology / Oncology; Future for evaluation and treatment.        Marialuisa was seen today for anxiety.    Diagnoses and all orders for this visit:    Generalized anxiety disorder  -     Ambulatory referral/consult to Psychology; Future    Cervical cancer screening  -     Ambulatory referral/consult to Obstetrics / Gynecology; Future    Neutropenia, unspecified type  -     Ambulatory referral/consult to Hematology / Oncology; Future          Medication List with Changes/Refills   Current Medications    BUSPIRONE (BUSPAR) 5 MG TAB    Take 1 tablet (5 mg total) by mouth 2 (two) times daily as needed (anxiety).       Start Date: 4/25/2023 End Date: 7/24/2023          Follow up in about 6 months (around 1/26/2024) for Anxiety followup- 30 minute appointment.

## 2023-09-13 DIAGNOSIS — D70.8 OTHER NEUTROPENIA: ICD-10-CM

## 2023-09-13 DIAGNOSIS — D70.9 NEUTROPENIA: Primary | ICD-10-CM

## 2023-09-21 LAB
HPV16+18+H RISK 12 DNA CVX-IMP: NEGATIVE
PAP RECOMMENDATION EXT: NORMAL
PAP SMEAR: NORMAL

## 2023-09-24 NOTE — PROGRESS NOTES
Subjective:        PATIENT: Marialuisa Joseph  MRN: [id number]  DATE: 9/25/2023  Chief Complaint: Follow-up (New patient)        09/25/2023  Referral neutropenia  Previously seen by Eliz loco    This is a 52-year-old female whose most recent CBC on 05/26/2023 had a white count of 2.1, hemoglobin of 14 hematocrit of 43, and a neutrophil count of a 1000.  Reviewing back to blood 6 months ago she would a white count of 1.8 and a neutrophil count of 610.   S CBC from October of 2020 showed the patient's white count of 3.3 ANC of 1700    There is actual documentation from September 2016 with the patient had a white cell count of 3 an ANC of 1500.    At that time TPO antibodies were negative, Malone antibodies were negative, Scl-70 antibodies, and the rest of her HI lupus panel was also negative at that time.      Past Medical History:   Diagnosis Date    Anxiety     Arthritis     Syncope and collapse       .  Past Surgical History:   Procedure Laterality Date    LUMPECTOMY, BREAST Bilateral     TUBAL LIGATION        .  Family History   Problem Relation Age of Onset    Hypertension Mother     Arthritis Mother     Cancer Sister         Leukemia    Lupus Sister     Asthma Brother     Heart disease Maternal Grandmother       Social History     Socioeconomic History    Marital status:    Occupational History    Occupation: Researcher   Tobacco Use    Smoking status: Never    Smokeless tobacco: Never   Substance and Sexual Activity    Alcohol use: Yes     Alcohol/week: 1.0 standard drink of alcohol     Types: 1 Glasses of wine per week     Comment: social    Drug use: Never    Sexual activity: Yes     Social Determinants of Health     Financial Resource Strain: Low Risk  (2/2/2023)    Overall Financial Resource Strain (CARDIA)     Difficulty of Paying Living Expenses: Not hard at all   Food Insecurity: No Food Insecurity (2/2/2023)    Hunger Vital Sign     Worried About Running Out of Food in the Last Year: Never  true     Ran Out of Food in the Last Year: Never true   Transportation Needs: No Transportation Needs (2/2/2023)    PRAPARE - Transportation     Lack of Transportation (Medical): No     Lack of Transportation (Non-Medical): No   Physical Activity: Sufficiently Active (2/2/2023)    Exercise Vital Sign     Days of Exercise per Week: 7 days     Minutes of Exercise per Session: 60 min   Stress: No Stress Concern Present (2/2/2023)    [employer] [place] [employer] - Occupational Stress Questionnaire     Feeling of Stress : Not at all   Social Connections: Moderately Integrated (2/2/2023)    Social Connection and Isolation Panel [NHANES]     Frequency of Communication with Friends and Family: More than three times a week     Frequency of Social Gatherings with Friends and Family: More than three times a week     Attends Yarsani Services: 1 to 4 times per year     Attends Club or Organization Meetings: Never     Marital Status:    Housing Stability: Low Risk  (2/2/2023)    Housing Stability Vital Sign     Unable to Pay for Housing in the Last Year: No     Number of Places Lived in the Last Year: 1     Unstable Housing in the Last Year: No      .  Review of patient's allergies indicates:   Allergen Reactions    Toradol [ketorolac] Other (See Comments)     Hypotension reported        Current Outpatient Medications:     multivitamin (ONE DAILY MULTIVITAMIN) per tablet, Take 1 tablet by mouth once daily., Disp: , Rfl:    Review of Systems   Constitutional:  Positive for fatigue.   HENT: Negative.     Eyes: Negative.    Respiratory: Negative.     Cardiovascular: Negative.    Gastrointestinal: Negative.    Endocrine: Positive for heat intolerance.   Genitourinary: Negative.    Musculoskeletal: Negative.    Skin: Negative.    Allergic/Immunologic: Negative.    Neurological: Negative.    Hematological: Negative.    Psychiatric/Behavioral: Negative.            Objective:     Vitals:    09/25/23 1322   BP: (!)  146/84   Pulse: (!) 59   Temp: 96.3 °F (35.7 °C)         Physical Exam  Vitals reviewed.   Constitutional:       General: She is awake.      Appearance: Normal appearance.   HENT:      Head: Normocephalic and atraumatic.      Mouth/Throat:      Mouth: Mucous membranes are moist.      Pharynx: Oropharynx is clear.   Eyes:      Extraocular Movements: Extraocular movements intact.      Conjunctiva/sclera: Conjunctivae normal.      Pupils: Pupils are equal, round, and reactive to light.   Cardiovascular:      Rate and Rhythm: Normal rate and regular rhythm.      Pulses: Normal pulses.      Heart sounds: Normal heart sounds.   Pulmonary:      Effort: Pulmonary effort is normal.      Breath sounds: Normal breath sounds and air entry.   Chest:   Breasts:     Right: Normal.      Left: Normal.   Abdominal:      General: Abdomen is flat. Bowel sounds are normal.      Palpations: Abdomen is soft.      Tenderness: There is no abdominal tenderness.   Musculoskeletal:      Cervical back: Normal range of motion.      Right lower leg: No edema.      Left lower leg: No edema.   Lymphadenopathy:      Cervical: No cervical adenopathy.      Upper Body:      Right upper body: No axillary adenopathy.      Left upper body: No axillary adenopathy.      Lower Body: No right inguinal adenopathy. No left inguinal adenopathy.   Skin:     General: Skin is warm and dry.   Neurological:      General: No focal deficit present.      Mental Status: She is alert and oriented to person, place, and time. Mental status is at baseline.   Psychiatric:         Attention and Perception: Attention normal.         Mood and Affect: Mood and affect normal.         Behavior: Behavior is cooperative.         ECOG SCORE    0 - Fully active-able to carry on all pre-disease performance without restriction        .Lab Review:  In epic    Assessment/Plan:   Longstanding neutropenia   No signs of recurrent infection positive family history of lupus and leukemia.    No  alarming symptoms neutropenia present since at least 2016.  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   Orders Placed This Encounter    CBC Auto Differential    Copper, Serum    Path Review, Peripheral Smear    Vitamin B12    Folate    Ferritin    Blood typing, RBC antigens        Rojelio Perdomo MD

## 2023-09-25 ENCOUNTER — OFFICE VISIT (OUTPATIENT)
Dept: HEMATOLOGY/ONCOLOGY | Facility: CLINIC | Age: 53
End: 2023-09-25
Payer: COMMERCIAL

## 2023-09-25 VITALS
HEART RATE: 59 BPM | OXYGEN SATURATION: 97 % | HEIGHT: 69 IN | DIASTOLIC BLOOD PRESSURE: 84 MMHG | SYSTOLIC BLOOD PRESSURE: 146 MMHG | BODY MASS INDEX: 25.63 KG/M2 | TEMPERATURE: 96 F | WEIGHT: 173.06 LBS

## 2023-09-25 DIAGNOSIS — D70.8 OTHER NEUTROPENIA: ICD-10-CM

## 2023-09-25 DIAGNOSIS — D70.8 OTHER NEUTROPENIA: Primary | ICD-10-CM

## 2023-09-25 PROBLEM — D70.9 NEUTROPENIA: Status: ACTIVE | Noted: 2023-09-25

## 2023-09-25 LAB
BASOPHILS # BLD AUTO: 0.02 X10(3)/MCL
BASOPHILS NFR BLD AUTO: 0.8 %
EOSINOPHIL # BLD AUTO: 0.08 X10(3)/MCL (ref 0–0.9)
EOSINOPHIL NFR BLD AUTO: 3.1 %
ERYTHROCYTE [DISTWIDTH] IN BLOOD BY AUTOMATED COUNT: 12.9 % (ref 11.5–17)
FERRITIN SERPL-MCNC: 45.83 NG/ML (ref 4.63–204)
FOLATE SERPL-MCNC: 19.3 NG/ML (ref 7–31.4)
GROUP & RH: NORMAL
HCT VFR BLD AUTO: 42.1 % (ref 37–47)
HGB BLD-MCNC: 13.5 G/DL (ref 12–16)
IMM GRANULOCYTES # BLD AUTO: 0 X10(3)/MCL (ref 0–0.04)
IMM GRANULOCYTES NFR BLD AUTO: 0 %
INDIRECT COOMBS GEL: NORMAL
LYMPHOCYTES # BLD AUTO: 1.12 X10(3)/MCL (ref 0.6–4.6)
LYMPHOCYTES NFR BLD AUTO: 43.2 %
MCH RBC QN AUTO: 27.6 PG (ref 27–31)
MCHC RBC AUTO-ENTMCNC: 32.1 G/DL (ref 33–36)
MCV RBC AUTO: 86.1 FL (ref 80–94)
MONOCYTES # BLD AUTO: 0.29 X10(3)/MCL (ref 0.1–1.3)
MONOCYTES NFR BLD AUTO: 11.2 %
NEUTROPHILS # BLD AUTO: 1.08 X10(3)/MCL (ref 2.1–9.2)
NEUTROPHILS NFR BLD AUTO: 41.7 %
PLATELET # BLD AUTO: 212 X10(3)/MCL (ref 130–400)
PMV BLD AUTO: 10.4 FL (ref 7.4–10.4)
RBC # BLD AUTO: 4.89 X10(6)/MCL (ref 4.2–5.4)
SPECIMEN OUTDATE: NORMAL
VIT B12 SERPL-MCNC: 387 PG/ML (ref 213–816)
WBC # SPEC AUTO: 2.59 X10(3)/MCL (ref 4.5–11.5)

## 2023-09-25 PROCEDURE — 3077F PR MOST RECENT SYSTOLIC BLOOD PRESSURE >= 140 MM HG: ICD-10-PCS | Mod: CPTII,S$GLB,, | Performed by: INTERNAL MEDICINE

## 2023-09-25 PROCEDURE — 99204 OFFICE O/P NEW MOD 45 MIN: CPT | Mod: S$GLB,,, | Performed by: INTERNAL MEDICINE

## 2023-09-25 PROCEDURE — 99999 PR PBB SHADOW E&M-EST. PATIENT-LVL IV: ICD-10-PCS | Mod: PBBFAC,,, | Performed by: INTERNAL MEDICINE

## 2023-09-25 PROCEDURE — 36415 COLL VENOUS BLD VENIPUNCTURE: CPT | Performed by: FAMILY MEDICINE

## 2023-09-25 PROCEDURE — 3079F DIAST BP 80-89 MM HG: CPT | Mod: CPTII,S$GLB,, | Performed by: INTERNAL MEDICINE

## 2023-09-25 PROCEDURE — 3077F SYST BP >= 140 MM HG: CPT | Mod: CPTII,S$GLB,, | Performed by: INTERNAL MEDICINE

## 2023-09-25 PROCEDURE — 3008F PR BODY MASS INDEX (BMI) DOCUMENTED: ICD-10-PCS | Mod: CPTII,S$GLB,, | Performed by: INTERNAL MEDICINE

## 2023-09-25 PROCEDURE — 83921 ORGANIC ACID SINGLE QUANT: CPT | Performed by: INTERNAL MEDICINE

## 2023-09-25 PROCEDURE — 1160F PR REVIEW ALL MEDS BY PRESCRIBER/CLIN PHARMACIST DOCUMENTED: ICD-10-PCS | Mod: CPTII,S$GLB,, | Performed by: INTERNAL MEDICINE

## 2023-09-25 PROCEDURE — 1159F MED LIST DOCD IN RCRD: CPT | Mod: CPTII,S$GLB,, | Performed by: INTERNAL MEDICINE

## 2023-09-25 PROCEDURE — 3008F BODY MASS INDEX DOCD: CPT | Mod: CPTII,S$GLB,, | Performed by: INTERNAL MEDICINE

## 2023-09-25 PROCEDURE — 82728 ASSAY OF FERRITIN: CPT | Performed by: INTERNAL MEDICINE

## 2023-09-25 PROCEDURE — 82607 VITAMIN B-12: CPT | Performed by: INTERNAL MEDICINE

## 2023-09-25 PROCEDURE — 82525 ASSAY OF COPPER: CPT | Performed by: INTERNAL MEDICINE

## 2023-09-25 PROCEDURE — 82746 ASSAY OF FOLIC ACID SERUM: CPT | Performed by: INTERNAL MEDICINE

## 2023-09-25 PROCEDURE — 1159F PR MEDICATION LIST DOCUMENTED IN MEDICAL RECORD: ICD-10-PCS | Mod: CPTII,S$GLB,, | Performed by: INTERNAL MEDICINE

## 2023-09-25 PROCEDURE — 85025 COMPLETE CBC W/AUTO DIFF WBC: CPT | Performed by: INTERNAL MEDICINE

## 2023-09-25 PROCEDURE — 1160F RVW MEDS BY RX/DR IN RCRD: CPT | Mod: CPTII,S$GLB,, | Performed by: INTERNAL MEDICINE

## 2023-09-25 PROCEDURE — 99204 PR OFFICE/OUTPT VISIT, NEW, LEVL IV, 45-59 MIN: ICD-10-PCS | Mod: S$GLB,,, | Performed by: INTERNAL MEDICINE

## 2023-09-25 PROCEDURE — 99999 PR PBB SHADOW E&M-EST. PATIENT-LVL IV: CPT | Mod: PBBFAC,,, | Performed by: INTERNAL MEDICINE

## 2023-09-25 PROCEDURE — 36415 COLL VENOUS BLD VENIPUNCTURE: CPT | Performed by: INTERNAL MEDICINE

## 2023-09-25 PROCEDURE — 3079F PR MOST RECENT DIASTOLIC BLOOD PRESSURE 80-89 MM HG: ICD-10-PCS | Mod: CPTII,S$GLB,, | Performed by: INTERNAL MEDICINE

## 2023-09-25 PROCEDURE — 3044F HG A1C LEVEL LT 7.0%: CPT | Mod: CPTII,S$GLB,, | Performed by: INTERNAL MEDICINE

## 2023-09-25 PROCEDURE — 3044F PR MOST RECENT HEMOGLOBIN A1C LEVEL <7.0%: ICD-10-PCS | Mod: CPTII,S$GLB,, | Performed by: INTERNAL MEDICINE

## 2023-09-25 PROCEDURE — 86850 RBC ANTIBODY SCREEN: CPT | Performed by: INTERNAL MEDICINE

## 2023-09-25 RX ORDER — MULTIVITAMIN
1 TABLET ORAL DAILY
COMMUNITY

## 2023-09-26 LAB
COPPER SERPL-MCNC: 105 MCG/DL (ref 77–206)
HEMATOLOGIST REVIEW: NORMAL

## 2023-09-29 LAB — METHYLMALONATE SERPL-SCNC: 0.15 NMOL/ML

## 2023-10-13 ENCOUNTER — OFFICE VISIT (OUTPATIENT)
Dept: OBSTETRICS AND GYNECOLOGY | Facility: CLINIC | Age: 53
End: 2023-10-13

## 2023-10-13 ENCOUNTER — PATIENT MESSAGE (OUTPATIENT)
Dept: OBSTETRICS AND GYNECOLOGY | Facility: CLINIC | Age: 53
End: 2023-10-13

## 2023-10-13 VITALS
BODY MASS INDEX: 25.52 KG/M2 | WEIGHT: 172.81 LBS | DIASTOLIC BLOOD PRESSURE: 72 MMHG | SYSTOLIC BLOOD PRESSURE: 108 MMHG

## 2023-10-13 DIAGNOSIS — F41.9 ANXIETY: ICD-10-CM

## 2023-10-13 DIAGNOSIS — N95.1 MENOPAUSAL SYMPTOMS: Primary | ICD-10-CM

## 2023-10-13 PROCEDURE — 99204 OFFICE O/P NEW MOD 45 MIN: CPT | Mod: S$PBB,,, | Performed by: STUDENT IN AN ORGANIZED HEALTH CARE EDUCATION/TRAINING PROGRAM

## 2023-10-13 PROCEDURE — 99999 PR PBB SHADOW E&M-EST. PATIENT-LVL II: CPT | Mod: PBBFAC,,, | Performed by: STUDENT IN AN ORGANIZED HEALTH CARE EDUCATION/TRAINING PROGRAM

## 2023-10-13 PROCEDURE — 99204 PR OFFICE/OUTPT VISIT, NEW, LEVL IV, 45-59 MIN: ICD-10-PCS | Mod: S$PBB,,, | Performed by: STUDENT IN AN ORGANIZED HEALTH CARE EDUCATION/TRAINING PROGRAM

## 2023-10-13 PROCEDURE — 99999 PR PBB SHADOW E&M-EST. PATIENT-LVL II: ICD-10-PCS | Mod: PBBFAC,,, | Performed by: STUDENT IN AN ORGANIZED HEALTH CARE EDUCATION/TRAINING PROGRAM

## 2023-10-13 PROCEDURE — 99212 OFFICE O/P EST SF 10 MIN: CPT | Mod: PBBFAC,PO | Performed by: STUDENT IN AN ORGANIZED HEALTH CARE EDUCATION/TRAINING PROGRAM

## 2023-10-13 RX ORDER — BUSPIRONE HYDROCHLORIDE 5 MG/1
5 TABLET ORAL 2 TIMES DAILY
COMMUNITY

## 2023-10-13 RX ORDER — PROPRANOLOL HYDROCHLORIDE 10 MG/1
10 TABLET ORAL
Qty: 30 TABLET | Refills: 0 | Status: SHIPPED | OUTPATIENT
Start: 2023-10-13 | End: 2024-10-12

## 2023-11-14 NOTE — PROGRESS NOTES
Subjective:        PATIENT: Marialuisa Joseph  MRN: [id number]  DATE: 11/16/2023  Chief Complaint: Follow-up (Patient has a telemed 7 week f/u)        11/16/2023    Patient is scheduled for telemed follow up from her new patient visit.    Referral neutropenia  Previously seen by Eliz loco    This is a 52-year-old female whose most recent CBC on 05/26/2023 had a white count of 2.1, hemoglobin of 14 hematocrit of 43, and a neutrophil count of a 1000.  Reviewing back to blood 6 months ago she would a white count of 1.8 and a neutrophil count of 610.   S CBC from October of 2020 showed the patient's white count of 3.3 ANC of 1700    There is actual documentation from September 2016 with the patient had a white cell count of 3 an ANC of 1500.    At that time TPO antibodies were negative, Malone antibodies were negative, Scl-70 antibodies, and the rest of her HI lupus panel was also negative at that time.      Past Medical History:   Diagnosis Date    Anxiety     Arthritis     Syncope and collapse       .  Past Surgical History:   Procedure Laterality Date    LUMPECTOMY, BREAST Bilateral     TUBAL LIGATION        .  Family History   Problem Relation Age of Onset    Hypertension Mother     Arthritis Mother     Cancer Sister         Leukemia    Lupus Sister     Asthma Brother     Heart disease Maternal Grandmother       Social History     Socioeconomic History    Marital status:    Occupational History    Occupation: Researcher   Tobacco Use    Smoking status: Never    Smokeless tobacco: Never   Substance and Sexual Activity    Alcohol use: Yes     Alcohol/week: 1.0 standard drink of alcohol     Types: 1 Glasses of wine per week     Comment: social    Drug use: Never    Sexual activity: Yes     Social Determinants of Health     Financial Resource Strain: Low Risk  (2/2/2023)    Overall Financial Resource Strain (CARDIA)     Difficulty of Paying Living Expenses: Not hard at all   Food Insecurity: No Food  Insecurity (2/2/2023)    Hunger Vital Sign     Worried About Running Out of Food in the Last Year: Never true     Ran Out of Food in the Last Year: Never true   Transportation Needs: No Transportation Needs (2/2/2023)    PRAPARE - Transportation     Lack of Transportation (Medical): No     Lack of Transportation (Non-Medical): No   Physical Activity: Sufficiently Active (2/2/2023)    Exercise Vital Sign     Days of Exercise per Week: 7 days     Minutes of Exercise per Session: 60 min   Stress: No Stress Concern Present (2/2/2023)    [employer] - Occupational Stress Questionnaire     Feeling of Stress : Not at all   Social Connections: Moderately Integrated (2/2/2023)    Social Connection and Isolation Panel [NHANES]     Frequency of Communication with Friends and Family: More than three times a week     Frequency of Social Gatherings with Friends and Family: More than three times a week     Attends [employer] Services: 1 to 4 times per year     Attends Club or Organization Meetings: Never     Marital Status:    Housing Stability: Low Risk  (2/2/2023)    Housing Stability Vital Sign     Unable to Pay for Housing in the Last Year: No     Number of Places Lived in the Last Year: 1     Unstable Housing in the Last Year: No      .  Review of patient's allergies indicates:   Allergen Reactions    Toradol [ketorolac] Other (See Comments)     Hypotension reported        Current Outpatient Medications:     busPIRone (BUSPAR) 5 MG Tab, Take 5 mg by mouth 2 (two) times daily., Disp: , Rfl:     multivitamin (ONE DAILY MULTIVITAMIN) per tablet, Take 1 tablet by mouth once daily., Disp: , Rfl:     propranoloL (INDERAL) 10 MG tablet, Take 1 tablet (10 mg total) by mouth as needed (anxiety/panic)., Disp: 30 tablet, Rfl: 0   Review of Systems   Constitutional:  Positive for fatigue. Negative for appetite change and unexpected weight change.   HENT: Negative.     Eyes: Negative.  Negative for  visual disturbance.   Respiratory: Negative.  Negative for cough and shortness of breath.    Cardiovascular: Negative.  Negative for chest pain.   Gastrointestinal: Negative.  Negative for abdominal pain and diarrhea.   Endocrine: Positive for heat intolerance.   Genitourinary: Negative.  Negative for frequency.   Musculoskeletal: Negative.  Negative for back pain.   Skin: Negative.  Negative for rash.   Allergic/Immunologic: Negative.    Neurological: Negative.  Negative for headaches.   Hematological: Negative.  Negative for adenopathy.   Psychiatric/Behavioral: Negative.  The patient is not nervous/anxious.           Objective:     There were no vitals filed for this visit.        Physical Exam  Vitals reviewed.   Constitutional:       General: She is awake.      Appearance: Normal appearance.   HENT:      Head: Normocephalic and atraumatic.   Pulmonary:      Effort: Pulmonary effort is normal.      Breath sounds: Normal air entry.   Chest:   Breasts:     Right: Normal.      Left: Normal.   Lymphadenopathy:      Upper Body:      Right upper body: No axillary adenopathy.      Left upper body: No axillary adenopathy.      Lower Body: No right inguinal adenopathy. No left inguinal adenopathy.   Neurological:      General: No focal deficit present.      Mental Status: She is alert.   Psychiatric:         Attention and Perception: Attention normal.         Mood and Affect: Mood and affect normal.         Behavior: Behavior is cooperative.         Thought Content: Thought content normal.         Judgment: Judgment normal.         ECOG SCORE            .Lab Review:  Results for orders placed or performed in visit on 09/25/23   Copper, Serum   Result Value Ref Range    Copper 105 77 - 206 mcg/dL   Path Review, Peripheral Smear   Result Value Ref Range    Peripheral Smear Evaluation       - Leukopenia with predominantly mature granulocytes; no circulating blasts.    Roscoe Marshall M.D.      Vitamin B12   Result Value  Ref Range    Vitamin B12 Level 387 213 - 816 pg/mL   Folate   Result Value Ref Range    Folate Level 19.3 7.0 - 31.4 ng/mL   Ferritin   Result Value Ref Range    Ferritin Level 45.83 4.63 - 204.00 ng/mL   CBC with Differential   Result Value Ref Range    WBC 2.59 (L) 4.50 - 11.50 x10(3)/mcL    RBC 4.89 4.20 - 5.40 x10(6)/mcL    Hgb 13.5 12.0 - 16.0 g/dL    Hct 42.1 37.0 - 47.0 %    MCV 86.1 80.0 - 94.0 fL    MCH 27.6 27.0 - 31.0 pg    MCHC 32.1 (L) 33.0 - 36.0 g/dL    RDW 12.9 11.5 - 17.0 %    Platelet 212 130 - 400 x10(3)/mcL    MPV 10.4 7.4 - 10.4 fL    Neut % 41.7 %    Lymph % 43.2 %    Mono % 11.2 %    Eos % 3.1 %    Basophil % 0.8 %    Lymph # 1.12 0.6 - 4.6 x10(3)/mcL    Neut # 1.08 (L) 2.1 - 9.2 x10(3)/mcL    Mono # 0.29 0.1 - 1.3 x10(3)/mcL    Eos # 0.08 0 - 0.9 x10(3)/mcL    Baso # 0.02 <=0.2 x10(3)/mcL    IG# 0.00 0 - 0.04 x10(3)/mcL    IG% 0.0 %   Methylmalonic Acid, Serum   Result Value Ref Range    Methylmalonic Acid, QN 0.15 <=0.40 nmol/mL        Assessment/Plan:   Longstanding neutropenia   No signs of recurrent infection positive family history of lupus and leukemia.    No alarming symptoms neutropenia present since at least 2016.    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  If patient has signs of recurrent infection ,immunoglobulin panel and workup with immunology could be considered.  In addition to re-evaluation.         Follow up for discharge from clinic.           Rojelio Perdomo MD    Telemed: This visit was a telemedicine/telephone visit.  Real-time audio and video were used following [facility] protocols.  The patient and/or family agreed to the security of information at the location.  I was located in the [facility] with this visit occurred.  The patient was located at her office in [place].  I am licensed to practice in the [facility].  Total time with this patient was 10 minutes

## 2023-11-16 ENCOUNTER — OFFICE VISIT (OUTPATIENT)
Dept: HEMATOLOGY/ONCOLOGY | Facility: CLINIC | Age: 53
End: 2023-11-16
Payer: COMMERCIAL

## 2023-11-16 DIAGNOSIS — D70.8 OTHER NEUTROPENIA: Primary | ICD-10-CM

## 2023-11-16 PROCEDURE — 99212 OFFICE O/P EST SF 10 MIN: CPT | Mod: 95,,, | Performed by: INTERNAL MEDICINE

## 2023-11-16 PROCEDURE — 3044F PR MOST RECENT HEMOGLOBIN A1C LEVEL <7.0%: ICD-10-PCS | Mod: CPTII,95,, | Performed by: INTERNAL MEDICINE

## 2023-11-16 PROCEDURE — 1159F PR MEDICATION LIST DOCUMENTED IN MEDICAL RECORD: ICD-10-PCS | Mod: CPTII,95,, | Performed by: INTERNAL MEDICINE

## 2023-11-16 PROCEDURE — 1160F RVW MEDS BY RX/DR IN RCRD: CPT | Mod: CPTII,95,, | Performed by: INTERNAL MEDICINE

## 2023-11-16 PROCEDURE — 99212 PR OFFICE/OUTPT VISIT, EST, LEVL II, 10-19 MIN: ICD-10-PCS | Mod: 95,,, | Performed by: INTERNAL MEDICINE

## 2023-11-16 PROCEDURE — 1159F MED LIST DOCD IN RCRD: CPT | Mod: CPTII,95,, | Performed by: INTERNAL MEDICINE

## 2023-11-16 PROCEDURE — 1160F PR REVIEW ALL MEDS BY PRESCRIBER/CLIN PHARMACIST DOCUMENTED: ICD-10-PCS | Mod: CPTII,95,, | Performed by: INTERNAL MEDICINE

## 2023-11-16 PROCEDURE — 3044F HG A1C LEVEL LT 7.0%: CPT | Mod: CPTII,95,, | Performed by: INTERNAL MEDICINE

## 2024-05-01 ENCOUNTER — TELEPHONE (OUTPATIENT)
Dept: FAMILY MEDICINE | Facility: CLINIC | Age: 54
End: 2024-05-01
Payer: COMMERCIAL

## 2024-05-01 NOTE — TELEPHONE ENCOUNTER
Are there any outstanding tasks in the patients's chart (ex.labs,MM,etc)?  no  Do we have outstanding/pending referrals?  no  Has the patient been seen in and ER,UCC, or been admitted since last visit?  no  Has the patient seen any other health care provider(doctors) since last visit?  yes  Has the patient had any bloodwork or x-rays done since last visit?  yes

## 2024-05-21 ENCOUNTER — TELEPHONE (OUTPATIENT)
Dept: OBSTETRICS AND GYNECOLOGY | Facility: CLINIC | Age: 54
End: 2024-05-21
Payer: COMMERCIAL

## 2024-05-21 ENCOUNTER — TELEPHONE (OUTPATIENT)
Dept: FAMILY MEDICINE | Facility: CLINIC | Age: 54
End: 2024-05-21
Payer: COMMERCIAL

## 2024-05-21 NOTE — TELEPHONE ENCOUNTER
----- Message from Lisbeth Ocampo MD sent at 5/21/2024 12:50 PM CDT -----  Regarding: RE: PCP  I don't have any specific recommendations, but any provider in the [facility] should provide great care. Thanks.  ----- Message -----  From: Amalia Aguilar LPN  Sent: 5/21/2024  12:44 PM CDT  To: Lisbeth Ocampo MD  Subject: PCP                                              Do you have any provider recommendations for the [place] area? Pt recently moved Thanks  ----- Message -----  From: Emani Burgos  Sent: 5/21/2024  12:16 PM CDT  To: Damaris ROJAS Staff    Type:  Needs Medical Advice    Who Called: pt   Best Call Back Number: [phone]    Additional Information: pt recently moved .  called to speak to nurse to get  any provider recommendations  in the area she has now moved to ([place])

## 2024-05-21 NOTE — TELEPHONE ENCOUNTER
----- Message from Josseline Ward sent at 5/21/2024 12:25 PM CDT -----  Type:  Needs Medical Advice    Who Called: pt  Would the patient rather a call back or a response via [facility]? Call back  Best Call Back Number: [phone]  Additional Information: Pt just moved from [place] and was referred to Dr. Briggs by Dr. Ocampo. Pt has insurance through her employer at [employer], and is getting her updated insurance number today.

## 2024-05-21 NOTE — TELEPHONE ENCOUNTER
Spoke with pt. Pt wanted to get scheduled for a follow up visit for her menopausal symptoms. Pt is scheduled with  on 7/16. Pt VU

## 2024-07-16 ENCOUNTER — OFFICE VISIT (OUTPATIENT)
Dept: OBSTETRICS AND GYNECOLOGY | Facility: CLINIC | Age: 54
End: 2024-07-16
Payer: COMMERCIAL

## 2024-07-16 VITALS
HEART RATE: 66 BPM | WEIGHT: 172.94 LBS | BODY MASS INDEX: 25.54 KG/M2 | DIASTOLIC BLOOD PRESSURE: 83 MMHG | SYSTOLIC BLOOD PRESSURE: 119 MMHG

## 2024-07-16 DIAGNOSIS — N95.1 HOT FLASHES DUE TO MENOPAUSE: Primary | ICD-10-CM

## 2024-07-16 DIAGNOSIS — N89.8 VAGINAL DRYNESS: ICD-10-CM

## 2024-07-16 DIAGNOSIS — F41.9 ANXIETY: ICD-10-CM

## 2024-07-16 PROCEDURE — 99999 PR PBB SHADOW E&M-EST. PATIENT-LVL III: CPT | Mod: PBBFAC,,, | Performed by: STUDENT IN AN ORGANIZED HEALTH CARE EDUCATION/TRAINING PROGRAM

## 2024-07-16 PROCEDURE — 99213 OFFICE O/P EST LOW 20 MIN: CPT | Mod: S$GLB,,, | Performed by: STUDENT IN AN ORGANIZED HEALTH CARE EDUCATION/TRAINING PROGRAM

## 2024-07-16 PROCEDURE — 3008F BODY MASS INDEX DOCD: CPT | Mod: CPTII,S$GLB,, | Performed by: STUDENT IN AN ORGANIZED HEALTH CARE EDUCATION/TRAINING PROGRAM

## 2024-07-16 PROCEDURE — 1159F MED LIST DOCD IN RCRD: CPT | Mod: CPTII,S$GLB,, | Performed by: STUDENT IN AN ORGANIZED HEALTH CARE EDUCATION/TRAINING PROGRAM

## 2024-07-16 PROCEDURE — 3074F SYST BP LT 130 MM HG: CPT | Mod: CPTII,S$GLB,, | Performed by: STUDENT IN AN ORGANIZED HEALTH CARE EDUCATION/TRAINING PROGRAM

## 2024-07-16 PROCEDURE — 3079F DIAST BP 80-89 MM HG: CPT | Mod: CPTII,S$GLB,, | Performed by: STUDENT IN AN ORGANIZED HEALTH CARE EDUCATION/TRAINING PROGRAM

## 2024-07-16 RX ORDER — ESTRADIOL 0.1 MG/G
1 CREAM VAGINAL
Qty: 42.5 G | Refills: 3 | Status: SHIPPED | OUTPATIENT
Start: 2024-07-16 | End: 2024-07-16

## 2024-07-16 RX ORDER — GABAPENTIN 600 MG/1
600 TABLET ORAL NIGHTLY
Qty: 90 TABLET | Refills: 3 | Status: SHIPPED | OUTPATIENT
Start: 2024-07-16 | End: 2025-07-16

## 2024-07-16 RX ORDER — BUSPIRONE HYDROCHLORIDE 5 MG/1
5 TABLET ORAL 2 TIMES DAILY
Qty: 90 TABLET | Refills: 3 | Status: SHIPPED | OUTPATIENT
Start: 2024-07-16

## 2024-07-16 RX ORDER — ESTRADIOL 0.1 MG/G
1 CREAM VAGINAL
Qty: 42.5 G | Refills: 3 | Status: SHIPPED | OUTPATIENT
Start: 2024-07-16

## 2024-07-16 RX ORDER — GABAPENTIN 600 MG/1
600 TABLET ORAL NIGHTLY
Qty: 90 TABLET | Refills: 3 | Status: SHIPPED | OUTPATIENT
Start: 2024-07-16 | End: 2024-07-16

## 2024-07-16 NOTE — PROGRESS NOTES
CC: Follow-up    HPI:  Marialuisa Joseph is a 53 y.o. female No obstetric history on file. presents for follow up on menopausal symptoms. Hot flashes initially improved some with OTC supplements but relief stagnant, worse now in summer due to excessive heat. Has worse symptoms when gardening, talking walks (has to do only at dusk now due to heat). Does make hard to get adequate sleep due to multiple episodes of night sweats at times. Noticing vaginal dryness and decreased libido.  does travel for work so it is hard to be ready when time together is limited.     ROS:  GENERAL: No fever, chills, fatigability or weight loss.  VULVAR: No pain, no lesions and no itching.  VAGINAL: No relaxation, no itching, no discharge, no abnormal bleeding and no lesions.  ABDOMEN: No abdominal pain. Denies nausea. Denies vomiting. No diarrhea. No constipation  BREAST: Denies pain. No lumps. No discharge.  URINARY: No incontinence, no nocturia, no frequency and no dysuria.  CARDIOVASCULAR: No chest pain. No shortness of breath. No leg cramps.  NEUROLOGICAL: No headaches. No vision changes.      Patient History:  Past Medical History:   Diagnosis Date    Anxiety     Arthritis     Syncope and collapse      Past Surgical History:   Procedure Laterality Date    LUMPECTOMY, BREAST Bilateral     TUBAL LIGATION       Social History     Tobacco Use    Smoking status: Never    Smokeless tobacco: Never   Substance Use Topics    Alcohol use: Yes     Alcohol/week: 1.0 standard drink of alcohol     Types: 1 Glasses of wine per week     Comment: social    Drug use: Never     Family History   Problem Relation Name Age of Onset    Hypertension Mother      Arthritis Mother      Cancer Sister          Leukemia    Lupus Sister      Asthma Brother      Heart disease Maternal Grandmother       OB History   No obstetric history on file.       Objective:   /83   Pulse 66   Wt 78.4 kg (172 lb 15.2 oz)   BMI 25.54 kg/m²   No LMP recorded.  Patient is perimenopausal.      PHYSICAL EXAM:  APPEARANCE: Well nourished, well developed, in no acute distress.  AFFECT: WNL, alert and oriented x 3  SKIN: No acne or hirsutism  NECK: Neck symmetric without masses or thyromegaly  CHEST: Good respiratory effect  EXTREMITIES: No edema.      ASSESSMENT and PLAN:    ICD-10-CM ICD-9-CM    1. Hot flashes due to menopause  N95.1 627.2 gabapentin (NEURONTIN) 600 MG tablet      DISCONTINUED: gabapentin (NEURONTIN) 600 MG tablet      2. Vaginal dryness  N89.8 625.8 estradioL (ESTRACE) 0.01 % (0.1 mg/gram) vaginal cream      DISCONTINUED: estradioL (ESTRACE) 0.01 % (0.1 mg/gram) vaginal cream      3. Anxiety  F41.9 300.00 busPIRone (BUSPAR) 5 MG Tab          Menopause  - reviewed again today the risks and benefits of hormonal replacement therapy. Reviewed improvement in vasomotor symptoms, possible improvements in sleep and mood. Discussed increased risk of thrombo-embolic events.   - alternative options also reviewed today including paroxetine, gabapentin, Veozah.   - will continue OTC supplement and add gabapentin at night as she is having a lot of night sweats and can still manage most days hot flashes during wake hours    2. Anxiety   - continue buspar, working well for her, refilled     Pap up to date (care everywhere, Sept 2023 negative HPV, unsatisfactory cytology)   Mammogram up to due May 2024, ordered by PCP      Follow up: as needed if symptoms worsen or 1 year FREDRICK Jaramillo MD  OBGYN [facility]

## 2025-02-04 ENCOUNTER — OFFICE VISIT (OUTPATIENT)
Dept: URGENT CARE | Facility: CLINIC | Age: 55
End: 2025-02-04
Payer: COMMERCIAL

## 2025-02-04 VITALS
RESPIRATION RATE: 18 BRPM | WEIGHT: 172 LBS | SYSTOLIC BLOOD PRESSURE: 98 MMHG | BODY MASS INDEX: 25.48 KG/M2 | OXYGEN SATURATION: 95 % | HEART RATE: 75 BPM | DIASTOLIC BLOOD PRESSURE: 66 MMHG | TEMPERATURE: 99 F | HEIGHT: 69 IN

## 2025-02-04 DIAGNOSIS — J02.9 SORE THROAT: ICD-10-CM

## 2025-02-04 DIAGNOSIS — R09.82 ALLERGIC RHINITIS WITH POSTNASAL DRIP: Primary | ICD-10-CM

## 2025-02-04 DIAGNOSIS — J30.9 ALLERGIC RHINITIS WITH POSTNASAL DRIP: Primary | ICD-10-CM

## 2025-02-04 LAB
CTP QC/QA: YES
CTP QC/QA: YES
POC MOLECULAR INFLUENZA A AGN: NEGATIVE
POC MOLECULAR INFLUENZA B AGN: NEGATIVE
SARS CORONAVIRUS 2 ANTIGEN: NEGATIVE

## 2025-02-04 PROCEDURE — 87502 INFLUENZA DNA AMP PROBE: CPT | Mod: QW,S$GLB,, | Performed by: FAMILY MEDICINE

## 2025-02-04 PROCEDURE — 87811 SARS-COV-2 COVID19 W/OPTIC: CPT | Mod: QW,S$GLB,, | Performed by: FAMILY MEDICINE

## 2025-02-04 PROCEDURE — 99203 OFFICE O/P NEW LOW 30 MIN: CPT | Mod: S$GLB,,, | Performed by: FAMILY MEDICINE

## 2025-02-04 RX ORDER — BENZOCAINE .13; .15; .5; 2 G/100G; G/100G; G/100G; G/100G
2 GEL ORAL DAILY
Qty: 8.43 ML | Refills: 3 | Status: SHIPPED | OUTPATIENT
Start: 2025-02-04

## 2025-02-04 RX ORDER — DEXAMETHASONE 4 MG/1
8 TABLET ORAL DAILY
Qty: 6 TABLET | Refills: 0 | Status: SHIPPED | OUTPATIENT
Start: 2025-02-04 | End: 2025-02-07

## 2025-02-04 NOTE — PROGRESS NOTES
"Subjective:      Patient ID: Marialuisa Joseph is a 54 y.o. female.    Vitals:  height is 5' 9" (1.753 m) and weight is 78 kg (172 lb). Her oral temperature is 98.8 °F (37.1 °C). Her blood pressure is 98/66 and her pulse is 75. Her respiration is 18 and oxygen saturation is 95%.     Chief Complaint: Cough    This is a 54 y.o. female who presents today with a chief complaint of cough. Patient has a contreras pain under her rib cage when she cough. Patient 's Symptoms are neck pain, swollen glands and body aches.       Cough  This is a new problem. The current episode started yesterday. The problem has been unchanged. The problem occurs hourly. Associated symptoms include ear pain and a sore throat. Treatments tried: tylenol.       HENT:  Positive for ear pain and sore throat.    Respiratory:  Positive for cough.       Objective:     Physical Exam   Constitutional: She is oriented to person, place, and time. She appears well-developed. She is cooperative.  Non-toxic appearance. She does not appear ill. No distress.   HENT:   Head: Normocephalic and atraumatic.   Ears:   Right Ear: Hearing, tympanic membrane, external ear and ear canal normal.   Left Ear: Hearing, tympanic membrane, external ear and ear canal normal.   Nose: Nose normal. No mucosal edema, rhinorrhea or nasal deformity. No epistaxis. Right sinus exhibits no maxillary sinus tenderness and no frontal sinus tenderness. Left sinus exhibits no maxillary sinus tenderness and no frontal sinus tenderness.   Mouth/Throat: Uvula is midline and mucous membranes are normal. No trismus in the jaw. Normal dentition. No uvula swelling. Posterior oropharyngeal erythema present. No oropharyngeal exudate or posterior oropharyngeal edema.       Eyes: Conjunctivae and lids are normal. Right eye exhibits chemosis. Left eye exhibits chemosis. Right conjunctiva is not injected. Left conjunctiva is not injected. No scleral icterus.   Neck: Trachea normal and phonation " normal. Neck supple. No edema present. No erythema present. No neck rigidity present.   Cardiovascular: Normal rate, regular rhythm, normal heart sounds and normal pulses.   Pulmonary/Chest: Effort normal and breath sounds normal. No respiratory distress. She has no decreased breath sounds. She has no rhonchi.   Abdominal: Normal appearance.   Musculoskeletal: Normal range of motion.         General: No deformity. Normal range of motion.   Neurological: She is alert and oriented to person, place, and time. She exhibits normal muscle tone. Coordination normal.   Skin: Skin is warm, dry, intact, not diaphoretic and not pale.   Psychiatric: Her speech is normal and behavior is normal. Judgment and thought content normal.   Nursing note and vitals reviewed.      Assessment:     1. Allergic rhinitis with postnasal drip    2. Sore throat        Plan:       Allergic rhinitis with postnasal drip  -     dexAMETHasone (DECADRON) 4 MG Tab; Take 2 tablets (8 mg total) by mouth once daily. for 3 doses  Dispense: 6 tablet; Refill: 0  -     budesonide (RINOCORT AQUA) 32 mcg/actuation nasal spray; 2 sprays (64 mcg total) by Nasal route Daily.  Dispense: 8.43 mL; Refill: 3    Sore throat  -     SARS Coronavirus 2 Antigen, POCT Manual Read  -     POCT Influenza A/B MOLECULAR        Thank you for choosing [facility]!     Our goal in the Urgent Care is to always provide outstanding medical care. You may receive a survey by mail or e-mail in the next week regarding your experience today. We would greatly appreciate you completing and returning the survey. Your feedback provides us with a way to recognize our staff who provide very good care, and it helps us learn how to improve when your experience was below our aspiration of excellence.       We appreciate you trusting us with your medical care. We hope you feel better soon. We will be happy to take care of you for all of your future medical needs.  You must understand that  you've received an Urgent Care treatment only and that you may be released before all your medical problems are known or treated. You, the patient, will arrange for follow up care as instructed.  Follow up with your PCP or specialty clinic as directed in the next 1-2 weeks if not improved or as needed.  You can call [phone] to schedule an appointment with the appropriate provider.  Another option is to follow up with [facility] ([email or web address]) virtually for quick simple medical advice.  If your condition worsens we recommend that you receive another evaluation at the emergency room immediately or contact your primary medical clinics after hours call service to discuss your concerns.  Please return here or go to the Emergency Department for any concerns or worsening of condition.      *If you were prescribed a narcotic or controlled medication, do not drive or operate heavy equipment or machinery while taking these medications.

## 2025-03-27 ENCOUNTER — DOCUMENTATION ONLY (OUTPATIENT)
Facility: CLINIC | Age: 55
End: 2025-03-27
Payer: COMMERCIAL

## 2025-08-18 ENCOUNTER — OFFICE VISIT (OUTPATIENT)
Dept: URGENT CARE | Facility: CLINIC | Age: 55
End: 2025-08-18
Payer: COMMERCIAL

## 2025-08-18 VITALS
WEIGHT: 172 LBS | RESPIRATION RATE: 16 BRPM | TEMPERATURE: 98 F | BODY MASS INDEX: 25.4 KG/M2 | OXYGEN SATURATION: 97 % | SYSTOLIC BLOOD PRESSURE: 106 MMHG | HEART RATE: 59 BPM | DIASTOLIC BLOOD PRESSURE: 72 MMHG

## 2025-08-18 DIAGNOSIS — R09.81 NASAL CONGESTION: ICD-10-CM

## 2025-08-18 DIAGNOSIS — R05.9 COUGH, UNSPECIFIED TYPE: ICD-10-CM

## 2025-08-18 DIAGNOSIS — R52 BODY ACHES: Primary | ICD-10-CM

## 2025-08-18 DIAGNOSIS — U07.1 COVID-19: ICD-10-CM

## 2025-08-18 LAB
CTP QC/QA: YES
SARS-COV+SARS-COV-2 AG RESP QL IA.RAPID: POSITIVE

## 2025-08-18 PROCEDURE — 87811 SARS-COV-2 COVID19 W/OPTIC: CPT | Mod: QW,S$GLB,, | Performed by: FAMILY MEDICINE

## 2025-08-18 PROCEDURE — 99213 OFFICE O/P EST LOW 20 MIN: CPT | Mod: S$GLB,,, | Performed by: FAMILY MEDICINE

## 2025-08-18 RX ORDER — IPRATROPIUM BROMIDE 21 UG/1
2 SPRAY, METERED NASAL 2 TIMES DAILY PRN
Qty: 30 ML | Refills: 0 | Status: SHIPPED | OUTPATIENT
Start: 2025-08-18

## 2025-08-18 RX ORDER — NIRMATRELVIR AND RITONAVIR 300-100 MG
KIT ORAL
Qty: 30 TABLET | Refills: 0 | Status: SHIPPED | OUTPATIENT
Start: 2025-08-18

## 2025-08-18 RX ORDER — BENZONATATE 200 MG/1
200 CAPSULE ORAL 3 TIMES DAILY PRN
Qty: 30 CAPSULE | Refills: 0 | Status: SHIPPED | OUTPATIENT
Start: 2025-08-18 | End: 2025-08-28